# Patient Record
Sex: MALE | Race: WHITE | NOT HISPANIC OR LATINO | Employment: OTHER | URBAN - METROPOLITAN AREA
[De-identification: names, ages, dates, MRNs, and addresses within clinical notes are randomized per-mention and may not be internally consistent; named-entity substitution may affect disease eponyms.]

---

## 2018-07-24 ENCOUNTER — OFFICE VISIT (OUTPATIENT)
Dept: SURGERY | Facility: CLINIC | Age: 83
End: 2018-07-24
Payer: MEDICARE

## 2018-07-24 VITALS
HEART RATE: 60 BPM | WEIGHT: 159.81 LBS | BODY MASS INDEX: 25.08 KG/M2 | SYSTOLIC BLOOD PRESSURE: 112 MMHG | TEMPERATURE: 98 F | DIASTOLIC BLOOD PRESSURE: 57 MMHG | HEIGHT: 67 IN

## 2018-07-24 DIAGNOSIS — K62.4 RECTAL STENOSIS: Primary | ICD-10-CM

## 2018-07-24 PROCEDURE — 99213 OFFICE O/P EST LOW 20 MIN: CPT | Mod: PBBFAC,PO | Performed by: SURGERY

## 2018-07-24 PROCEDURE — 99999 PR PBB SHADOW E&M-EST. PATIENT-LVL III: CPT | Mod: PBBFAC,,, | Performed by: SURGERY

## 2018-07-24 PROCEDURE — 99203 OFFICE O/P NEW LOW 30 MIN: CPT | Mod: S$PBB,,, | Performed by: SURGERY

## 2018-07-24 RX ORDER — TERAZOSIN 10 MG/1
1 CAPSULE ORAL DAILY
COMMUNITY

## 2018-07-24 RX ORDER — LISINOPRIL 10 MG/1
1 TABLET ORAL DAILY
Status: ON HOLD | COMMUNITY
Start: 2018-07-19 | End: 2018-09-24 | Stop reason: HOSPADM

## 2018-07-24 RX ORDER — ATENOLOL 25 MG/1
1 TABLET ORAL DAILY
Status: ON HOLD | COMMUNITY
Start: 2018-06-06 | End: 2018-09-21

## 2018-07-24 NOTE — LETTER
July 24, 2018      Cuauhtemoc Bush MD  [address]  [facility] [place] [address]           [facility] - General Surgery  [address] [place]  [facility] [address]  Phone: [phone]          Patient: Kevin Schulte   MR Number: [id number]   YOB: 1926   Date of Visit: 7/24/2018       Dear Dr. Cuauhtemoc Bush:    Thank you for referring Kevin Schulte to me for evaluation. Attached you will find relevant portions of my assessment and plan of care.    If you have questions, please do not hesitate to call me. I look forward to following Kevin Schulte along with you.    Sincerely,    Nagi Guo MD    Enclosure  CC:  No Recipients    If you would like to receive this communication electronically, please contact [email or web address] or [phone] to request more information on [facility] access.    For providers and/or their staff who would like to refer a patient to [facility], please contact us through our one-stop-shop provider referral line, [facility], at [phone].    If you feel you have received this communication in error or would no longer like to receive these types of communications, please e-mail [email or web address]

## 2018-07-24 NOTE — PATIENT INSTRUCTIONS
Surgery is scheduled for 07/30/18 arrival time per the preop nurse.  Preop will call from [phone]  Fasting after midnight  Someone to drive you home      THE PREOP NURSE WILL CALL, SOMETIMES AS LATE AS 4 PM IN THE AFTERNOON THE DAY BEFORE SURGERY.    Bathe the night before and the morning of your procedure with a Chlorhexidine wash such as Hibiclens, can be purchased at most Pharmacy's.    Special Instruction:Purchase two Fleet Enema;s at your Pharmacy, use one the evening before the procedure and one the morning of the procedure.

## 2018-07-24 NOTE — PROGRESS NOTES
Subjective:       Patient ID: Kevin Schulte is a 89 y.o. male.    Chief Complaint: Consult (Rectal stenosis)    HPI  Pleasant 89 yo M referred to me for evaluation of rectal stenosis.  Pt notes that he had surgery 7 years ago for rectal stenosis.  This was done by Dr Mauricio and required a flap.  He notes that he feels he has another stenosis.  Feels difficulty having a BM and having to strain.  Notes frequent loose stools and pain. He denies bleeding.  Pt notes that he has not had colonoscopy since 2010. He has had significant weight loss in past year due to poor appetite. He lives at home with his wife.   Review of Systems   Constitutional: Negative for activity change, appetite change, diaphoresis, fever and unexpected weight change.   Respiratory: Negative for cough, chest tightness and wheezing.    Cardiovascular: Negative for chest pain and palpitations.   Gastrointestinal: Positive for rectal pain. Negative for abdominal distention, abdominal pain, anal bleeding, blood in stool, constipation, diarrhea, nausea and vomiting.   Genitourinary: Negative for difficulty urinating, dysuria and hematuria.   Musculoskeletal: Negative for back pain and gait problem.   Skin: Negative for color change and wound.   Neurological: Negative for tremors and seizures.   Hematological: Negative for adenopathy. Does not bruise/bleed easily.       Objective:      Physical Exam   Constitutional: He is oriented to person, place, and time.   HENT:   Mouth/Throat: No oropharyngeal exudate.   Neck: No thyromegaly present.   Pulmonary/Chest: Effort normal. No respiratory distress. He exhibits no tenderness.   Abdominal: Soft. He exhibits no distension. There is no tenderness. No hernia.   Genitourinary:   Genitourinary Comments: Ext exam demonstrates significant fecal residue and soiling in the perianal area.  DEANA attempted. Stenosis noted at anal margin   Musculoskeletal: Normal range of motion. He exhibits no tenderness or  deformity.   Lymphadenopathy:     He has no cervical adenopathy.   Neurological: He is alert and oriented to person, place, and time. No cranial nerve deficit.   Skin: Skin is warm. No erythema. No pallor.   Psychiatric: He has a normal mood and affect. His behavior is normal.   Vitals reviewed.      Assessment:     Anal stenosis   No diagnosis found.    Plan:       D/w pt recommended EUA withdilation.  This will be done next Monday.  Informed consent obtained.

## 2018-07-25 RX ORDER — METRONIDAZOLE 500 MG/100ML
500 INJECTION, SOLUTION INTRAVENOUS
Status: CANCELLED | OUTPATIENT
Start: 2018-07-25

## 2018-07-25 RX ORDER — SODIUM CHLORIDE 9 MG/ML
INJECTION, SOLUTION INTRAVENOUS CONTINUOUS
Status: CANCELLED | OUTPATIENT
Start: 2018-07-25

## 2018-07-26 ENCOUNTER — HOSPITAL ENCOUNTER (OUTPATIENT)
Dept: CARDIOLOGY | Facility: HOSPITAL | Age: 83
Discharge: HOME | End: 2018-07-26
Attending: SURGERY
Payer: MEDICARE

## 2018-07-26 PROCEDURE — 93005 ELECTROCARDIOGRAM TRACING: CPT

## 2018-07-29 ENCOUNTER — ANESTHESIA EVENT (OUTPATIENT)
Dept: SURGERY | Facility: HOSPITAL | Age: 83
End: 2018-07-29
Payer: MEDICARE

## 2018-07-30 ENCOUNTER — TELEPHONE (OUTPATIENT)
Dept: SURGERY | Facility: CLINIC | Age: 83
End: 2018-07-30

## 2018-07-30 ENCOUNTER — HOSPITAL ENCOUNTER (OUTPATIENT)
Facility: HOSPITAL | Age: 83
Discharge: HOME | End: 2018-07-30
Attending: SURGERY | Admitting: SURGERY
Payer: MEDICARE

## 2018-07-30 ENCOUNTER — ANESTHESIA (OUTPATIENT)
Dept: SURGERY | Facility: HOSPITAL | Age: 83
End: 2018-07-30
Payer: MEDICARE

## 2018-07-30 DIAGNOSIS — K62.4 RECTAL STENOSIS: ICD-10-CM

## 2018-07-30 PROCEDURE — 71000033 HC RECOVERY, INTIAL HOUR: Performed by: SURGERY

## 2018-07-30 PROCEDURE — 46606 ANOSCOPY AND BIOPSY: CPT | Mod: ,,, | Performed by: SURGERY

## 2018-07-30 PROCEDURE — 37000008 HC ANESTHESIA 1ST 15 MINUTES: Performed by: SURGERY

## 2018-07-30 PROCEDURE — 99900103 DSU ONLY-NO CHARGE-INITIAL HR (STAT): Performed by: SURGERY

## 2018-07-30 PROCEDURE — 99900104 DSU ONLY-NO CHARGE-EA ADD'L HR (STAT): Performed by: SURGERY

## 2018-07-30 PROCEDURE — 36000705 HC OR TIME LEV I EA ADD 15 MIN: Performed by: SURGERY

## 2018-07-30 PROCEDURE — 63600175 PHARM REV CODE 636 W HCPCS: Performed by: SURGERY

## 2018-07-30 PROCEDURE — 25000003 PHARM REV CODE 250: Performed by: ANESTHESIOLOGY

## 2018-07-30 PROCEDURE — D9220A PRA ANESTHESIA: Mod: CRNA,,, | Performed by: NURSE ANESTHETIST, CERTIFIED REGISTERED

## 2018-07-30 PROCEDURE — 71000015 HC POSTOP RECOV 1ST HR: Performed by: SURGERY

## 2018-07-30 PROCEDURE — 25000003 PHARM REV CODE 250: Performed by: SURGERY

## 2018-07-30 PROCEDURE — 71000016 HC POSTOP RECOV ADDL HR: Performed by: SURGERY

## 2018-07-30 PROCEDURE — 37000009 HC ANESTHESIA EA ADD 15 MINS: Performed by: SURGERY

## 2018-07-30 PROCEDURE — 36000704 HC OR TIME LEV I 1ST 15 MIN: Performed by: SURGERY

## 2018-07-30 PROCEDURE — S0030 INJECTION, METRONIDAZOLE: HCPCS | Performed by: SURGERY

## 2018-07-30 PROCEDURE — D9220A PRA ANESTHESIA: Mod: ANES,,, | Performed by: ANESTHESIOLOGY

## 2018-07-30 PROCEDURE — 63600175 PHARM REV CODE 636 W HCPCS: Performed by: NURSE ANESTHETIST, CERTIFIED REGISTERED

## 2018-07-30 PROCEDURE — 88305 TISSUE EXAM BY PATHOLOGIST: CPT | Performed by: PATHOLOGY

## 2018-07-30 RX ORDER — MEPERIDINE HYDROCHLORIDE 25 MG/ML
12.5 INJECTION INTRAMUSCULAR; INTRAVENOUS; SUBCUTANEOUS ONCE AS NEEDED
Status: DISCONTINUED | OUTPATIENT
Start: 2018-07-30 | End: 2018-07-30 | Stop reason: HOSPADM

## 2018-07-30 RX ORDER — SODIUM CHLORIDE, SODIUM LACTATE, POTASSIUM CHLORIDE, CALCIUM CHLORIDE 600; 310; 30; 20 MG/100ML; MG/100ML; MG/100ML; MG/100ML
INJECTION, SOLUTION INTRAVENOUS CONTINUOUS
Status: DISCONTINUED | OUTPATIENT
Start: 2018-07-30 | End: 2018-07-30 | Stop reason: HOSPADM

## 2018-07-30 RX ORDER — ONDANSETRON 2 MG/ML
4 INJECTION INTRAMUSCULAR; INTRAVENOUS ONCE
Status: DISCONTINUED | OUTPATIENT
Start: 2018-07-30 | End: 2018-07-30 | Stop reason: HOSPADM

## 2018-07-30 RX ORDER — LIDOCAINE HYDROCHLORIDE 10 MG/ML
1 INJECTION, SOLUTION EPIDURAL; INFILTRATION; INTRACAUDAL; PERINEURAL ONCE
Status: DISCONTINUED | OUTPATIENT
Start: 2018-07-30 | End: 2018-07-30 | Stop reason: HOSPADM

## 2018-07-30 RX ORDER — DIPHENHYDRAMINE HYDROCHLORIDE 50 MG/ML
25 INJECTION INTRAMUSCULAR; INTRAVENOUS EVERY 6 HOURS PRN
Status: DISCONTINUED | OUTPATIENT
Start: 2018-07-30 | End: 2018-07-30 | Stop reason: HOSPADM

## 2018-07-30 RX ORDER — SODIUM CHLORIDE 0.9 % (FLUSH) 0.9 %
3 SYRINGE (ML) INJECTION
Status: DISCONTINUED | OUTPATIENT
Start: 2018-07-30 | End: 2018-07-30 | Stop reason: HOSPADM

## 2018-07-30 RX ORDER — METOCLOPRAMIDE HYDROCHLORIDE 5 MG/ML
5 INJECTION INTRAMUSCULAR; INTRAVENOUS EVERY 6 HOURS PRN
Status: DISCONTINUED | OUTPATIENT
Start: 2018-07-30 | End: 2018-07-30 | Stop reason: HOSPADM

## 2018-07-30 RX ORDER — LIDOCAINE HCL/PF 100 MG/5ML
SYRINGE (ML) INTRAVENOUS
Status: DISCONTINUED | OUTPATIENT
Start: 2018-07-30 | End: 2018-07-30

## 2018-07-30 RX ORDER — SODIUM CHLORIDE, SODIUM LACTATE, POTASSIUM CHLORIDE, CALCIUM CHLORIDE 600; 310; 30; 20 MG/100ML; MG/100ML; MG/100ML; MG/100ML
75 INJECTION, SOLUTION INTRAVENOUS CONTINUOUS
Status: DISCONTINUED | OUTPATIENT
Start: 2018-07-30 | End: 2018-07-30 | Stop reason: HOSPADM

## 2018-07-30 RX ORDER — PROPOFOL 10 MG/ML
VIAL (ML) INTRAVENOUS
Status: DISCONTINUED | OUTPATIENT
Start: 2018-07-30 | End: 2018-07-30

## 2018-07-30 RX ORDER — SODIUM CHLORIDE 9 MG/ML
INJECTION, SOLUTION INTRAVENOUS CONTINUOUS
Status: DISCONTINUED | OUTPATIENT
Start: 2018-07-30 | End: 2018-07-30 | Stop reason: HOSPADM

## 2018-07-30 RX ORDER — FENTANYL CITRATE 50 UG/ML
25 INJECTION, SOLUTION INTRAMUSCULAR; INTRAVENOUS EVERY 5 MIN PRN
Status: DISCONTINUED | OUTPATIENT
Start: 2018-07-30 | End: 2018-07-30 | Stop reason: HOSPADM

## 2018-07-30 RX ORDER — FENTANYL CITRATE 50 UG/ML
INJECTION, SOLUTION INTRAMUSCULAR; INTRAVENOUS
Status: DISCONTINUED | OUTPATIENT
Start: 2018-07-30 | End: 2018-07-30

## 2018-07-30 RX ORDER — HYDROMORPHONE HYDROCHLORIDE 1 MG/ML
0.2 INJECTION, SOLUTION INTRAMUSCULAR; INTRAVENOUS; SUBCUTANEOUS EVERY 5 MIN PRN
Status: DISCONTINUED | OUTPATIENT
Start: 2018-07-30 | End: 2018-07-30 | Stop reason: HOSPADM

## 2018-07-30 RX ORDER — OXYCODONE AND ACETAMINOPHEN 5; 325 MG/1; MG/1
1 TABLET ORAL EVERY 4 HOURS PRN
Qty: 30 TABLET | Refills: 0 | Status: ON HOLD | OUTPATIENT
Start: 2018-07-30 | End: 2018-09-24 | Stop reason: HOSPADM

## 2018-07-30 RX ORDER — METRONIDAZOLE 500 MG/100ML
500 INJECTION, SOLUTION INTRAVENOUS
Status: COMPLETED | OUTPATIENT
Start: 2018-07-30 | End: 2018-07-30

## 2018-07-30 RX ORDER — BUPIVACAINE HYDROCHLORIDE AND EPINEPHRINE 2.5; 5 MG/ML; UG/ML
INJECTION, SOLUTION EPIDURAL; INFILTRATION; INTRACAUDAL; PERINEURAL
Status: DISCONTINUED | OUTPATIENT
Start: 2018-07-30 | End: 2018-07-30 | Stop reason: HOSPADM

## 2018-07-30 RX ORDER — HYDROCODONE BITARTRATE AND ACETAMINOPHEN 5; 325 MG/1; MG/1
1 TABLET ORAL EVERY 4 HOURS PRN
Status: DISCONTINUED | OUTPATIENT
Start: 2018-07-30 | End: 2018-07-30 | Stop reason: HOSPADM

## 2018-07-30 RX ORDER — OXYCODONE HYDROCHLORIDE 5 MG/1
5 TABLET ORAL
Status: DISCONTINUED | OUTPATIENT
Start: 2018-07-30 | End: 2018-07-30 | Stop reason: HOSPADM

## 2018-07-30 RX ADMIN — FENTANYL CITRATE 50 MCG: 50 INJECTION, SOLUTION INTRAMUSCULAR; INTRAVENOUS at 09:07

## 2018-07-30 RX ADMIN — PROPOFOL 80 MG: 10 INJECTION, EMULSION INTRAVENOUS at 09:07

## 2018-07-30 RX ADMIN — SODIUM CHLORIDE, SODIUM LACTATE, POTASSIUM CHLORIDE, AND CALCIUM CHLORIDE: .6; .31; .03; .02 INJECTION, SOLUTION INTRAVENOUS at 09:07

## 2018-07-30 RX ADMIN — LIDOCAINE HYDROCHLORIDE 75 MG: 20 INJECTION, SOLUTION INTRAVENOUS at 09:07

## 2018-07-30 RX ADMIN — CEFTRIAXONE 2 G: 2 INJECTION, POWDER, FOR SOLUTION INTRAMUSCULAR; INTRAVENOUS at 09:07

## 2018-07-30 RX ADMIN — METRONIDAZOLE 500 MG: 500 INJECTION, SOLUTION INTRAVENOUS at 09:07

## 2018-07-30 NOTE — TELEPHONE ENCOUNTER
----- Message from Jazzy Lucio sent at 7/30/2018 11:39 AM CDT -----  Contact: Monica -[facility]  Monica -[facility] calling needs to schedule pt a post-op visit for 2 weeks please...[phone] (home)

## 2018-07-30 NOTE — ANESTHESIA POSTPROCEDURE EVALUATION
"Anesthesia Post Evaluation    Patient: Kevin Schulte    Procedure(s) Performed: Procedure(s) (LRB):  Exam under anesthesia (N/A)  DILATION, RECTUM (N/A)  BIOPSY, ANORECTAL WALL (N/A)    Final Anesthesia Type: general  Patient location during evaluation: PACU  Patient participation: Yes- Able to Participate  Level of consciousness: awake and alert and oriented  Post-procedure vital signs: reviewed and stable  Pain management: adequate  Airway patency: patent  PONV status at discharge: No PONV  Anesthetic complications: no      Cardiovascular status: blood pressure returned to baseline  Respiratory status: unassisted, spontaneous ventilation and room air  Hydration status: euvolemic  Follow-up not needed.        Visit Vitals  BP (!) 122/56   Pulse 72   Temp 36.7 °C (98.1 °F) (Temporal)   Resp (!) 21   Ht 5' 7" (1.702 m)   Wt 72.6 kg (160 lb)   SpO2 100%   BMI 25.06 kg/m²       Pain/Juaquin Score: Pain Assessment Performed: Yes (7/30/2018 10:10 AM)  Presence of Pain: denies (7/30/2018 10:30 AM)  Juaquin Score: 9 (7/30/2018 10:30 AM)      "

## 2018-07-30 NOTE — INTERVAL H&P NOTE
The patient has been examined and the H&P has been reviewed:    I concur with the findings and no changes have occurred since H&P was written.    Anesthesia/Surgery risks, benefits and alternative options discussed and understood by patient/family.          Active Hospital Problems    Diagnosis  POA    Rectal stenosis [K62.4]  Yes      Resolved Hospital Problems    Diagnosis Date Resolved POA   No resolved problems to display.

## 2018-07-30 NOTE — BRIEF OP NOTE
[facility]  Brief Operative Note     SUMMARY     Surgery Date: 7/30/2018     Surgeon(s) and Role:     * Nagi Guo MD - Primary    Assisting Surgeon: None    Pre-op Diagnosis:  Rectal stenosis [K62.4]    Post-op Diagnosis:  Post-Op Diagnosis Codes:     * Rectal stenosis [K62.4]    Procedure(s) (LRB):  Exam under anesthesia (N/A)  DILATION, RECTUM (N/A)  BIOPSY, ANORECTAL WALL (N/A)    Anesthesia: General    Description of the findings of the procedure: Stenosis of anal canal.  Appearance of scarring from previous surgery.  No mass or signs of malignancy .  Biopsy taken    Findings/Key Components: see above.     Estimated Blood Loss: 5 cc's       Specimens:   Specimen (12h ago through future)    Start     Ordered    07/30/18 0955  Specimen to Pathology - Surgery  Once     Comments:  Pre-op Diagnosis: Rectal stenosis [K62.4]Post-op Diagnosis: sameProcedure(s):Exam under anesthesia Number of specimens: 1Name of specimens: Rectal Biopsy      07/30/18 0957          Discharge Note    SUMMARY     Admit Date: 7/30/2018    Discharge Date and Time:  07/30/2018 10:09 AM    Hospital Course (synopsis of major diagnoses, care, treatment, and services provided during the course of the hospital stay): Pt presented for EUA with dilation and biopsy of anal stenosis. Procedure and post op course were uneventful.       Final Diagnosis: Post-Op Diagnosis Codes:     * Rectal stenosis [K62.4]    Disposition: Home or Self Care    Follow Up/Patient Instructions:     Medications:  Reconciled Home Medications:      Medication List      START taking these medications    oxyCODONE-acetaminophen 5-325 mg per tablet  Commonly known as:  PERCOCET  Take 1 tablet by mouth every 4 (four) hours as needed for Pain.        CONTINUE taking these medications    atenolol 25 MG tablet  Commonly known as:  TENORMIN  Take 1 tablet by mouth once daily.     lisinopril 10 MG tablet  Take 1 tablet by mouth once daily.     terazosin 10 MG  capsule  Commonly known as:  HYTRIN  Take 1 capsule by mouth once daily.            Discharge Procedure Orders  Diet general     Call MD for:  extreme fatigue     Call MD for:  persistent dizziness or light-headedness     Call MD for:  hives     Call MD for:  redness, tenderness, or signs of infection (pain, swelling, redness, odor or green/yellow discharge around incision site)     Call MD for:  difficulty breathing, headache or visual disturbances     Call MD for:  severe uncontrolled pain     Call MD for:  persistent nausea and vomiting     Call MD for:  temperature >100.4     Remove dressing in 48 hours     Activity as tolerated       Follow-up Information     Nagi Guo MD In 2 weeks.    Specialties:  General Surgery, Colon and Rectal Surgery, Surgery  Contact information:  [address] [place] [address]  [phone]

## 2018-07-30 NOTE — PLAN OF CARE
Pt in phase II rec. Wife at bedside explained dc isntructions to pt and wife. Both verb understanding. Pt voided 100 cc clear yel urine  Will wheel to car via wheelchair when able

## 2018-07-30 NOTE — H&P (VIEW-ONLY)
Subjective:       Patient ID: Kevin Schulte is a 89 y.o. male.    Chief Complaint: Consult (Rectal stenosis)    HPI  Pleasant 89 yo M referred to me for evaluation of rectal stenosis.  Pt notes that he had surgery 7 years ago for rectal stenosis.  This was done by Dr Mauricio and required a flap.  He notes that he feels he has another stenosis.  Feels difficulty having a BM and having to strain.  Notes frequent loose stools and pain. He denies bleeding.  Pt notes that he has not had colonoscopy since 2010. He has had significant weight loss in past year due to poor appetite. He lives at home with his wife.   Review of Systems   Constitutional: Negative for activity change, appetite change, diaphoresis, fever and unexpected weight change.   Respiratory: Negative for cough, chest tightness and wheezing.    Cardiovascular: Negative for chest pain and palpitations.   Gastrointestinal: Positive for rectal pain. Negative for abdominal distention, abdominal pain, anal bleeding, blood in stool, constipation, diarrhea, nausea and vomiting.   Genitourinary: Negative for difficulty urinating, dysuria and hematuria.   Musculoskeletal: Negative for back pain and gait problem.   Skin: Negative for color change and wound.   Neurological: Negative for tremors and seizures.   Hematological: Negative for adenopathy. Does not bruise/bleed easily.       Objective:      Physical Exam   Constitutional: He is oriented to person, place, and time.   HENT:   Mouth/Throat: No oropharyngeal exudate.   Neck: No thyromegaly present.   Pulmonary/Chest: Effort normal. No respiratory distress. He exhibits no tenderness.   Abdominal: Soft. He exhibits no distension. There is no tenderness. No hernia.   Genitourinary:   Genitourinary Comments: Ext exam demonstrates significant fecal residue and soiling in the perianal area.  DEANA attempted. Stenosis noted at anal margin   Musculoskeletal: Normal range of motion. He exhibits no tenderness or  deformity.   Lymphadenopathy:     He has no cervical adenopathy.   Neurological: He is alert and oriented to person, place, and time. No cranial nerve deficit.   Skin: Skin is warm. No erythema. No pallor.   Psychiatric: He has a normal mood and affect. His behavior is normal.   Vitals reviewed.      Assessment:     Anal stenosis   No diagnosis found.    Plan:       D/w pt recommended EUA withdilation.  This will be done next Monday.  Informed consent obtained.

## 2018-07-30 NOTE — TRANSFER OF CARE
"Anesthesia Transfer of Care Note    Patient: Kevin Schulte    Procedure(s) Performed: Procedure(s) (LRB):  Exam under anesthesia (N/A)  DILATION, RECTUM (N/A)  BIOPSY, ANORECTAL WALL (N/A)    Patient location: PACU    Anesthesia Type: general    Transport from OR: Transported from OR on 2-3 L/min O2 by NC with adequate spontaneous ventilation    Post pain: adequate analgesia    Post assessment: no apparent anesthetic complications    Post vital signs: stable    Level of consciousness: awake    Nausea/Vomiting: no nausea/vomiting    Complications: none    Transfer of care protocol was followed      Last vitals:   Visit Vitals  BP (!) 159/70 (BP Location: Left arm, Patient Position: Lying)   Pulse 72   Temp 36.6 °C (97.9 °F) (Oral)   Resp 16   Ht 5' 7" (1.702 m)   Wt 72.6 kg (160 lb)   SpO2 100%   BMI 25.06 kg/m²     "

## 2018-07-30 NOTE — PLAN OF CARE
Pt. AAOx4, calm and cooperative.  Pt. Requires assistance to transfer due to limited mobility and arthritis.  Pt. Uses wheelchair to ambulate.  Pt. Unable to shower due to inabilty to safely get in and out of shower or bath at home. RN cleansed rectal area with several chlorhexidine wipes.  Rn reviewed plan of care with pt. And spouse who both verbalized understanding.  Questions answered, comfort assured.

## 2018-07-30 NOTE — OP NOTE
DATE OF PROCEDURE:  07/30/2018.    STAFF SURGEON:  Nagi Guo M.D.    PREOPERATIVE DIAGNOSIS:  Anal stenosis.    POSTOPERATIVE DIAGNOSIS:  Anal stenosis.    PROCEDURES PERFORMED:  1.  Exam under anesthesia with dilatation of anal stenosis.  2.  Biopsy of anal stenosis.    ANESTHESIA:  General via LMA.    INDICATION FOR PROCEDURE:  A pleasant 89-year-old gentleman who presented to my   office with concern for worsening anal stenosis.  The patient has had previous   surgery approximately 12 years ago for anal stenosis with anoplasty by Dr. Mauricio, adhesions were lysed.  He has had difficulty with bowel movements.  On   exam, he was noted to have anal stenosis.    DESCRIPTION OF PROCEDURE:  Following signing of informed consent, he received   preoperative IV antibiotics, taken to the OR and placed in supine position.    SCDs were placed.  General anesthesia via an LMA was administered.  He was   rolled and placed in left lateral position.  With buttocks apart, the perianal   area was prepped and draped in standard fashion.  A timeout was then performed.    I then performed digital rectal exam.  I was able to insert my middle finger   with some ____ there was stenosis.  I then progressively dilated with Hegar   dilators beginning with a size 12 and advanced all the way up to a size 18.    Having done that, I placed a dilator ____ in there for approximately 30 seconds   to a minute ____ done this, there was much more mobility and I was able to   visualize better.  The anoscope was inserted.  No obvious mass present.  There   seem to be scarring.  This stenosis seem to be related to scarring from previous   surgery.  Biopsies were taken.  I cleaned the area out, dressing was applied.    The patient was awakened and taken to Recovery Room in stable condition.  There   were no immediate complications.      RAHEEM/IN  dd: 07/30/2018 10:13:16 (CDT)  td: 07/30/2018 14:22:29 (CDT)  Doc ID   #[id number]  Job ID #[id number]    CC:

## 2018-07-30 NOTE — PLAN OF CARE
Tolerated procedure well transferred to recovery via stretcher.  Report given to SOPHIE Cunningham.

## 2018-07-30 NOTE — ANESTHESIA PREPROCEDURE EVALUATION
2018  Kevin Schulte is a 89 y.o., male.    Anesthesia Evaluation    I have reviewed the Patient Summary Reports.    I have reviewed the Nursing Notes.   I have reviewed the Medications.     Review of Systems  Anesthesia Hx:  No problems with previous Anesthesia    Social:  Former Smoker Smoking Status: Former Smoker  Quit Smokin88  Smokeless Tobacco Status: Never Used  Alcohol use: Not Asked  Drug use: Not Asked       Hepatic/GI:  Denies Hepatic/GI Symptoms  Denies Bowel Conditions        Physical Exam  General:  Obesity    Airway/Jaw/Neck:  Airway Findings: Mouth Opening: Normal Tongue: Normal  General Airway Assessment: Adult, Good  Mallampati: II  Improves to II with phonation.  TM Distance: 4-6 cm      Dental:  Dental Findings: In tact   Chest/Lungs:  Chest/Lungs Findings: Clear to auscultation, Normal Respiratory Rate     Heart/Vascular:  Heart Findings: Rate: Normal  Rhythm: Regular Rhythm  Sounds: Normal  Heart murmur: negative       Mental Status:  Mental Status Findings:  Cooperative, Alert and Oriented         Anesthesia Plan  Type of Anesthesia, risks & benefits discussed:  Anesthesia Type:  general  Patient's Preference:   Intra-op Monitoring Plan: standard ASA monitors  Intra-op Monitoring Plan Comments:   Post Op Pain Control Plan:   Post Op Pain Control Plan Comments:   Induction:   IV  Beta Blocker:  Patient is on a Beta-Blocker and has received one dose within the past 24 hours (No further documentation required).       Informed Consent: Patient understands risks and agrees with Anesthesia plan.  Questions answered. Anesthesia consent signed with patient.  ASA Score: 3     Day of Surgery Review of History & Physical: I have interviewed and examined the patient. I have reviewed the patient's H&P dated:  There are no significant changes.          Ready For Surgery From  Anesthesia Perspective.

## 2018-07-31 VITALS
WEIGHT: 160 LBS | OXYGEN SATURATION: 100 % | TEMPERATURE: 98 F | HEART RATE: 76 BPM | HEIGHT: 67 IN | BODY MASS INDEX: 25.11 KG/M2 | DIASTOLIC BLOOD PRESSURE: 60 MMHG | SYSTOLIC BLOOD PRESSURE: 117 MMHG | RESPIRATION RATE: 16 BRPM

## 2018-08-07 ENCOUNTER — TELEPHONE (OUTPATIENT)
Dept: SURGERY | Facility: CLINIC | Age: 83
End: 2018-08-07

## 2018-08-07 NOTE — TELEPHONE ENCOUNTER
----- Message from Mecca Mullen sent at 8/7/2018 12:43 PM CDT -----  Contact: [facility]  Type: Needs Medical Advice    Who Called:  wife  Symptoms (please be specific):  See bleow  How long has patient had these symptoms:  See below  Pharmacy name and phone #:  [facility] in [place], [phone]  Best Call Back Number: [phone]  Additional Information:  Patient's wife is calling to state patient has had one bowel movement since procedure 7/30/18 and feels as if has something to expel but can not do so. Asking for suggestions to help expel. Thanks!

## 2018-08-07 NOTE — TELEPHONE ENCOUNTER
Patient is constipated, has tried Milk of Magnesia and Miralax with no relief.  Recommend to try Citrate of Magnesia, drink lots of fluids and move around.

## 2018-08-14 ENCOUNTER — OFFICE VISIT (OUTPATIENT)
Dept: SURGERY | Facility: CLINIC | Age: 83
End: 2018-08-14
Payer: MEDICARE

## 2018-08-14 VITALS
HEART RATE: 94 BPM | DIASTOLIC BLOOD PRESSURE: 65 MMHG | HEIGHT: 67 IN | SYSTOLIC BLOOD PRESSURE: 145 MMHG | TEMPERATURE: 98 F | BODY MASS INDEX: 25.99 KG/M2 | WEIGHT: 165.56 LBS

## 2018-08-14 DIAGNOSIS — Z09 POSTOP CHECK: Primary | ICD-10-CM

## 2018-08-14 PROBLEM — K62.4 RECTAL STENOSIS: Status: RESOLVED | Noted: 2018-07-30 | Resolved: 2018-08-14

## 2018-08-14 PROCEDURE — 99213 OFFICE O/P EST LOW 20 MIN: CPT | Mod: PBBFAC,PO | Performed by: SURGERY

## 2018-08-14 PROCEDURE — 99999 PR PBB SHADOW E&M-EST. PATIENT-LVL III: CPT | Mod: PBBFAC,,, | Performed by: SURGERY

## 2018-08-14 PROCEDURE — 99024 POSTOP FOLLOW-UP VISIT: CPT | Mod: POP,,, | Performed by: SURGERY

## 2018-08-14 NOTE — PROGRESS NOTES
Cc: post op    HPI: 89 y.o.  male  2 weeks s/p EUA with dilation.   Pt notes that he is feeling well.  No pain. Having better bowel function over the last 4 days.      PE: AFVSS    AAOx3  CTA  Soft/NT/nd  Inc: c/d/i        Path:   ANAL MUCOSAL BIOPSIES, CLINICALLY IDENTIFIED AS RECTAL BIOPSIES:  -Polypoid fragment of benign squamous-lined tissue with marked fibrosis, mild hyperkeratosis and focal  parakeratosis.  -No evidence of dysplasia or malignancy.  NMCH.  Diagnosed    A/P:   Pt doing well post surgery.   F/U with me prn  Pt may need repeat dilation in future.

## 2018-09-18 ENCOUNTER — HOSPITAL ENCOUNTER (INPATIENT)
Facility: HOSPITAL | Age: 83
LOS: 4 days | Discharge: NURSING, REHAB OR LONG-TERM CARE | DRG: 291 | End: 2018-09-24
Attending: FAMILY MEDICINE | Admitting: INTERNAL MEDICINE
Payer: MEDICARE

## 2018-09-18 DIAGNOSIS — I50.9 ACUTE ON CHRONIC CONGESTIVE HEART FAILURE, UNSPECIFIED HEART FAILURE TYPE: Primary | ICD-10-CM

## 2018-09-18 DIAGNOSIS — I50.9 CHF (CONGESTIVE HEART FAILURE): ICD-10-CM

## 2018-09-18 LAB
ALBUMIN SERPL BCP-MCNC: 2.9 G/DL
ALP SERPL-CCNC: 51 U/L
ALT SERPL W/O P-5'-P-CCNC: 22 U/L
ANION GAP SERPL CALC-SCNC: 12 MMOL/L
AST SERPL-CCNC: 50 U/L
BASOPHILS # BLD AUTO: 0.01 K/UL
BASOPHILS NFR BLD: 0.1 %
BILIRUB SERPL-MCNC: 0.7 MG/DL
BNP SERPL-MCNC: 2054 PG/ML
BUN SERPL-MCNC: 26 MG/DL
CALCIUM SERPL-MCNC: 8.4 MG/DL
CHLORIDE SERPL-SCNC: 96 MMOL/L
CO2 SERPL-SCNC: 21 MMOL/L
CREAT SERPL-MCNC: 1.4 MG/DL
DIFFERENTIAL METHOD: ABNORMAL
EOSINOPHIL # BLD AUTO: 0 K/UL
EOSINOPHIL NFR BLD: 0 %
ERYTHROCYTE [DISTWIDTH] IN BLOOD BY AUTOMATED COUNT: 13.9 %
EST. GFR  (AFRICAN AMERICAN): 50.1 ML/MIN/1.73 M^2
EST. GFR  (NON AFRICAN AMERICAN): 43.3 ML/MIN/1.73 M^2
GLUCOSE SERPL-MCNC: 228 MG/DL
HCT VFR BLD AUTO: 27.4 %
HGB BLD-MCNC: 9.1 G/DL
IMM GRANULOCYTES # BLD AUTO: 0.08 K/UL
IMM GRANULOCYTES NFR BLD AUTO: 0.6 %
LYMPHOCYTES # BLD AUTO: 0.4 K/UL
LYMPHOCYTES NFR BLD: 3.2 %
MCH RBC QN AUTO: 30.4 PG
MCHC RBC AUTO-ENTMCNC: 33.2 G/DL
MCV RBC AUTO: 92 FL
MONOCYTES # BLD AUTO: 1.3 K/UL
MONOCYTES NFR BLD: 10.2 %
NEUTROPHILS # BLD AUTO: 10.6 K/UL
NEUTROPHILS NFR BLD: 85.9 %
NRBC BLD-RTO: 0 /100 WBC
PLATELET # BLD AUTO: 168 K/UL
PMV BLD AUTO: 11.3 FL
POTASSIUM SERPL-SCNC: 4 MMOL/L
PROT SERPL-MCNC: 6.5 G/DL
RBC # BLD AUTO: 2.99 M/UL
SODIUM SERPL-SCNC: 129 MMOL/L
WBC # BLD AUTO: 12.36 K/UL

## 2018-09-18 PROCEDURE — G0378 HOSPITAL OBSERVATION PER HR: HCPCS

## 2018-09-18 PROCEDURE — 99285 EMERGENCY DEPT VISIT HI MDM: CPT | Mod: 25

## 2018-09-18 PROCEDURE — 63600175 PHARM REV CODE 636 W HCPCS: Performed by: FAMILY MEDICINE

## 2018-09-18 PROCEDURE — 96372 THER/PROPH/DIAG INJ SC/IM: CPT | Mod: 59

## 2018-09-18 PROCEDURE — 25000003 PHARM REV CODE 250: Performed by: INTERNAL MEDICINE

## 2018-09-18 PROCEDURE — 83880 ASSAY OF NATRIURETIC PEPTIDE: CPT

## 2018-09-18 PROCEDURE — 27000221 HC OXYGEN, UP TO 24 HOURS

## 2018-09-18 PROCEDURE — 63600175 PHARM REV CODE 636 W HCPCS: Performed by: INTERNAL MEDICINE

## 2018-09-18 PROCEDURE — 85025 COMPLETE CBC W/AUTO DIFF WBC: CPT

## 2018-09-18 PROCEDURE — 70450 CT HEAD/BRAIN W/O DYE: CPT | Mod: TC

## 2018-09-18 PROCEDURE — 72125 CT NECK SPINE W/O DYE: CPT | Mod: 26,,, | Performed by: RADIOLOGY

## 2018-09-18 PROCEDURE — 72125 CT NECK SPINE W/O DYE: CPT | Mod: TC

## 2018-09-18 PROCEDURE — 96374 THER/PROPH/DIAG INJ IV PUSH: CPT

## 2018-09-18 PROCEDURE — 94760 N-INVAS EAR/PLS OXIMETRY 1: CPT

## 2018-09-18 PROCEDURE — 96376 TX/PRO/DX INJ SAME DRUG ADON: CPT

## 2018-09-18 PROCEDURE — 70450 CT HEAD/BRAIN W/O DYE: CPT | Mod: 26,,, | Performed by: RADIOLOGY

## 2018-09-18 PROCEDURE — 25000003 PHARM REV CODE 250: Performed by: FAMILY MEDICINE

## 2018-09-18 PROCEDURE — 80053 COMPREHEN METABOLIC PANEL: CPT

## 2018-09-18 PROCEDURE — 71045 X-RAY EXAM CHEST 1 VIEW: CPT | Mod: 26,,, | Performed by: RADIOLOGY

## 2018-09-18 PROCEDURE — 71045 X-RAY EXAM CHEST 1 VIEW: CPT | Mod: TC,FY

## 2018-09-18 RX ORDER — TERAZOSIN 1 MG/1
10 CAPSULE ORAL DAILY
Status: DISCONTINUED | OUTPATIENT
Start: 2018-09-18 | End: 2018-09-20

## 2018-09-18 RX ORDER — FUROSEMIDE 10 MG/ML
40 INJECTION INTRAMUSCULAR; INTRAVENOUS 2 TIMES DAILY
Status: DISCONTINUED | OUTPATIENT
Start: 2018-09-18 | End: 2018-09-22

## 2018-09-18 RX ORDER — FUROSEMIDE 10 MG/ML
60 INJECTION INTRAMUSCULAR; INTRAVENOUS
Status: COMPLETED | OUTPATIENT
Start: 2018-09-18 | End: 2018-09-18

## 2018-09-18 RX ORDER — ATENOLOL 25 MG/1
25 TABLET ORAL DAILY
Status: DISCONTINUED | OUTPATIENT
Start: 2018-09-19 | End: 2018-09-21

## 2018-09-18 RX ORDER — POTASSIUM CHLORIDE 20 MEQ/1
20 TABLET, EXTENDED RELEASE ORAL 2 TIMES DAILY
Status: DISCONTINUED | OUTPATIENT
Start: 2018-09-18 | End: 2018-09-22

## 2018-09-18 RX ORDER — TRAMADOL HYDROCHLORIDE 50 MG/1
50 TABLET ORAL EVERY 6 HOURS PRN
COMMUNITY

## 2018-09-18 RX ORDER — LISINOPRIL 2.5 MG/1
5 TABLET ORAL DAILY
Status: DISCONTINUED | OUTPATIENT
Start: 2018-09-19 | End: 2018-09-19

## 2018-09-18 RX ORDER — ENOXAPARIN SODIUM 100 MG/ML
40 INJECTION SUBCUTANEOUS EVERY 24 HOURS
Status: DISCONTINUED | OUTPATIENT
Start: 2018-09-18 | End: 2018-09-24 | Stop reason: HOSPADM

## 2018-09-18 RX ADMIN — POTASSIUM CHLORIDE 20 MEQ: 1500 TABLET, EXTENDED RELEASE ORAL at 08:09

## 2018-09-18 RX ADMIN — FUROSEMIDE 60 MG: 10 INJECTION, SOLUTION INTRAMUSCULAR; INTRAVENOUS at 02:09

## 2018-09-18 RX ADMIN — TERAZOSIN HYDROCHLORIDE ANHYDROUS 10 MG: 1 CAPSULE ORAL at 04:09

## 2018-09-18 RX ADMIN — FUROSEMIDE 40 MG: 10 INJECTION, SOLUTION INTRAMUSCULAR; INTRAVENOUS at 05:09

## 2018-09-18 RX ADMIN — ENOXAPARIN SODIUM 40 MG: 100 INJECTION SUBCUTANEOUS at 05:09

## 2018-09-18 NOTE — PLAN OF CARE
09/18/18 1640   COATES Message   Medicare Outpatient and Observation Notification regarding financial responsibility Given to patient/caregiver;Explained to patient/caregiver;Signed/date by patient/caregiver   Date COATES was signed 09/18/18   Time COATES was signed 1640

## 2018-09-18 NOTE — H&P
[facility] Medicine  History & Physical    Patient Name: Kevin Schulte  MRN: [id number]  Admission Date: 9/18/2018  Attending Physician: Cuauhtemoc Bush MD   Primary Care Provider: Cuauhtemoc Bush MD         Patient information was obtained from patient, spouse/SO and ER records.     Subjective:     Principal Problem:Acute on chronic congestive heart failure    Chief Complaint:   Chief Complaint   Patient presents with    Fall     right leg gave away and fell. Head injury        HPI: Patient is 89-year-old male.  Dementia is present.  The patient fell approximately a week ago and presents emergency room at this time for workup for pain back right leg.  Noted on chest x-ray to have congestive heart failure.  The patient also complained of shortness of breath after questions.  Patient does not have a previous history of congestive heart failure.  He has been resistant workup in the past.  Patient has no peripheral edema.    Past Medical History:   Diagnosis Date    Hypertension        Past Surgical History:   Procedure Laterality Date    BIOPSY OF ANORECTAL WALL N/A 7/30/2018    Procedure: BIOPSY, ANORECTAL WALL;  Surgeon: Nagi Guo MD;  Location: [facility] OR;  Service: Colon and Rectal;  Laterality: N/A;    BIOPSY, ANORECTAL WALL N/A 7/30/2018    Performed by Nagi Guo MD at [facility] OR    DILATION, RECTUM N/A 7/30/2018    Performed by Nagi Guo MD at [facility] OR    Exam under anesthesia N/A 7/30/2018    Performed by Nagi Guo MD at [facility] OR    EXAMINATION UNDER ANESTHESIA N/A 7/30/2018    Procedure: Exam under anesthesia;  Surgeon: Nagi Guo MD;  Location: [facility] OR;  Service: Colon and Rectal;  Laterality: N/A;    hemorrhoidectopm      RECTAL DILATATION N/A 7/30/2018    Procedure: DILATION, RECTUM;  Surgeon: Nagi Guo MD;  Location: [facility] OR;  Service: Colon and Rectal;  Laterality: N/A;       Review of patient's allergies  indicates:  No Known Allergies    No current facility-administered medications on file prior to encounter.      Current Outpatient Medications on File Prior to Encounter   Medication Sig    terazosin (HYTRIN) 10 MG capsule Take 1 capsule by mouth once daily.    traMADol (ULTRAM) 50 mg tablet Take 50 mg by mouth every 6 (six) hours as needed for Pain.    atenolol (TENORMIN) 25 MG tablet Take 1 tablet by mouth once daily.    lisinopril 10 MG tablet Take 1 tablet by mouth once daily.    oxyCODONE-acetaminophen (PERCOCET) 5-325 mg per tablet Take 1 tablet by mouth every 4 (four) hours as needed for Pain.     Family History     None        Tobacco Use    Smoking status: Former Smoker     Packs/day: 0.25     Types: Cigarettes     Last attempt to quit: 1988     Years since quittin.1    Smokeless tobacco: Never Used   Substance and Sexual Activity    Alcohol use: Not on file    Drug use: Not on file    Sexual activity: Not on file     Review of Systems   Constitutional: Positive for activity change, appetite change, fatigue and unexpected weight change. Negative for chills, diaphoresis and fever.   HENT: Negative for congestion, drooling, hearing loss and trouble swallowing.    Eyes: Negative for pain and visual disturbance.   Respiratory: Positive for shortness of breath. Negative for apnea, cough, choking, chest tightness and wheezing.    Cardiovascular: Negative for chest pain, palpitations and leg swelling.   Gastrointestinal: Negative for abdominal distention, abdominal pain, blood in stool, constipation, diarrhea, nausea and vomiting.   Endocrine: Negative for polydipsia, polyphagia and polyuria.   Genitourinary: Negative for difficulty urinating, dysuria and flank pain.   Musculoskeletal: Positive for arthralgias, back pain and neck pain. Negative for gait problem, joint swelling and neck stiffness.   Skin: Negative for color change, rash and wound.   Allergic/Immunologic: Negative for  environmental allergies, food allergies and immunocompromised state.   Neurological: Positive for dizziness, syncope and weakness. Negative for numbness and headaches.   Hematological: Negative for adenopathy.   Psychiatric/Behavioral: Negative for agitation, confusion and sleep disturbance. The patient is not nervous/anxious.      Objective:     Vital Signs (Most Recent):  Temp: 97 °F (36.1 °C) (09/18/18 1510)  Pulse: 107 (09/18/18 1510)  Resp: 16 (09/18/18 1510)  BP: 128/71 (09/18/18 1510)  SpO2: (!) 94 % (09/18/18 1510) Vital Signs (24h Range):  Temp:  [97 °F (36.1 °C)-98.7 °F (37.1 °C)] 97 °F (36.1 °C)  Pulse:  [103-116] 107  Resp:  [16-24] 16  SpO2:  [90 %-96 %] 94 %  BP: (103-128)/(62-78) 128/71     Weight: 76.7 kg (169 lb)  Body mass index is 27.28 kg/m².    Physical Exam   Constitutional: He is oriented to person, place, and time. He appears well-developed and well-nourished.   HENT:   Head: Normocephalic and atraumatic.   Right Ear: External ear normal.   Left Ear: External ear normal.   Nose: Nose normal.   Eyes: Conjunctivae, EOM and lids are normal. Pupils are equal, round, and reactive to light.   Neck: Trachea normal, normal range of motion and full passive range of motion without pain. Neck supple.   Cardiovascular: Normal rate, regular rhythm, S1 normal, S2 normal, normal heart sounds, intact distal pulses and normal pulses.   Pulmonary/Chest: Effort normal. He has rales.   Rales decreased breath sounds lower bases.   Abdominal: Soft. Normal aorta and bowel sounds are normal.   Musculoskeletal: Normal range of motion.   Neurological: He is alert and oriented to person, place, and time. He has normal reflexes.   Skin: Skin is warm, dry and intact. Capillary refill takes less than 2 seconds.   Psychiatric: He has a normal mood and affect. His speech is normal and behavior is normal. Judgment and thought content normal. Cognition and memory are normal.   Nursing note and vitals reviewed.        CRANIAL  NERVES     CN III, IV, VI   Pupils are equal, round, and reactive to light.  Extraocular motions are normal.        Significant Labs:   CBC:   Recent Labs   Lab  09/18/18   1210   WBC  12.36   HGB  9.1*   HCT  27.4*   PLT  168     CMP:   Recent Labs   Lab  09/18/18   1210   NA  129*   K  4.0   CL  96   CO2  21*   GLU  228*   BUN  26   CREATININE  1.4   CALCIUM  8.4*   PROT  6.5   ALBUMIN  2.9*   BILITOT  0.7   ALKPHOS  51*   AST  50*   ALT  22   ANIONGAP  12   EGFRNONAA  43.3*     Cardiac Markers:   Recent Labs   Lab  09/18/18   1210   BNP  2,054*     Urine Studies: No results for input(s): COLORU, APPEARANCEUA, PHUR, SPECGRAV, PROTEINUA, GLUCUA, KETONESU, BILIRUBINUA, OCCULTUA, NITRITE, UROBILINOGEN, LEUKOCYTESUR, RBCUA, WBCUA, BACTERIA, SQUAMEPITHEL, HYALINECASTS in the last 48 hours.    Invalid input(s): WRIGHTSUR    Significant Imaging: CT: I have reviewed all pertinent results/findings within the past 24 hours and my personal findings are:  Agree with dictation CTs  CXR: I have reviewed all pertinent results/findings within the past 24 hours and my personal findings are:  Chest x-ray with congestive heart failure bilaterally increased interstitial markings and bilateral effusions  EKG: I have reviewed all pertinent results/findings within the past 24 hours and my personal findings are: No acute abnormality.  I have reviewed all pertinent imaging results/findings within the past 24 hours.    Assessment/Plan:     * Acute on chronic congestive heart failure                VTE Risk Mitigation (From admission, onward)        Ordered     Place ALE hose  Until discontinued      09/18/18 1526             Cuauhtemoc Bush MD  Department of Hospital Medicine   [facility] - Med Surg

## 2018-09-18 NOTE — ED TRIAGE NOTES
Patient fell 3 nights ago and hit the back of his head. His right knee gave out and he has been having problems with it. No LOC.

## 2018-09-18 NOTE — SUBJECTIVE & OBJECTIVE
Past Medical History:   Diagnosis Date    Hypertension        Past Surgical History:   Procedure Laterality Date    BIOPSY OF ANORECTAL WALL N/A 2018    Procedure: BIOPSY, ANORECTAL WALL;  Surgeon: Nagi Guo MD;  Location: [facility] OR;  Service: Colon and Rectal;  Laterality: N/A;    BIOPSY, ANORECTAL WALL N/A 2018    Performed by Nagi Guo MD at [facility] OR    DILATION, RECTUM N/A 2018    Performed by Nagi Guo MD at [facility] OR    Exam under anesthesia N/A 2018    Performed by Nagi Guo MD at [facility] OR    EXAMINATION UNDER ANESTHESIA N/A 2018    Procedure: Exam under anesthesia;  Surgeon: Nagi Guo MD;  Location: [facility] OR;  Service: Colon and Rectal;  Laterality: N/A;    hemorrhoidectopm      RECTAL DILATATION N/A 2018    Procedure: DILATION, RECTUM;  Surgeon: Nagi Guo MD;  Location: [facility] OR;  Service: Colon and Rectal;  Laterality: N/A;       Review of patient's allergies indicates:  No Known Allergies    No current facility-administered medications on file prior to encounter.      Current Outpatient Medications on File Prior to Encounter   Medication Sig    terazosin (HYTRIN) 10 MG capsule Take 1 capsule by mouth once daily.    traMADol (ULTRAM) 50 mg tablet Take 50 mg by mouth every 6 (six) hours as needed for Pain.    atenolol (TENORMIN) 25 MG tablet Take 1 tablet by mouth once daily.    lisinopril 10 MG tablet Take 1 tablet by mouth once daily.    oxyCODONE-acetaminophen (PERCOCET) 5-325 mg per tablet Take 1 tablet by mouth every 4 (four) hours as needed for Pain.     Family History     None        Tobacco Use    Smoking status: Former Smoker     Packs/day: 0.25     Types: Cigarettes     Last attempt to quit: 1988     Years since quittin.1    Smokeless tobacco: Never Used   Substance and Sexual Activity    Alcohol use: Not on file    Drug use: Not on file    Sexual activity: Not on file     Review of Systems    Constitutional: Positive for activity change, appetite change, fatigue and unexpected weight change. Negative for chills, diaphoresis and fever.   HENT: Negative for congestion, drooling, hearing loss and trouble swallowing.    Eyes: Negative for pain and visual disturbance.   Respiratory: Positive for shortness of breath. Negative for apnea, cough, choking, chest tightness and wheezing.    Cardiovascular: Negative for chest pain, palpitations and leg swelling.   Gastrointestinal: Negative for abdominal distention, abdominal pain, blood in stool, constipation, diarrhea, nausea and vomiting.   Endocrine: Negative for polydipsia, polyphagia and polyuria.   Genitourinary: Negative for difficulty urinating, dysuria and flank pain.   Musculoskeletal: Positive for arthralgias, back pain and neck pain. Negative for gait problem, joint swelling and neck stiffness.   Skin: Negative for color change, rash and wound.   Allergic/Immunologic: Negative for environmental allergies, food allergies and immunocompromised state.   Neurological: Positive for dizziness, syncope and weakness. Negative for numbness and headaches.   Hematological: Negative for adenopathy.   Psychiatric/Behavioral: Negative for agitation, confusion and sleep disturbance. The patient is not nervous/anxious.      Objective:     Vital Signs (Most Recent):  Temp: 97 °F (36.1 °C) (09/18/18 1510)  Pulse: 107 (09/18/18 1510)  Resp: 16 (09/18/18 1510)  BP: 128/71 (09/18/18 1510)  SpO2: (!) 94 % (09/18/18 1510) Vital Signs (24h Range):  Temp:  [97 °F (36.1 °C)-98.7 °F (37.1 °C)] 97 °F (36.1 °C)  Pulse:  [103-116] 107  Resp:  [16-24] 16  SpO2:  [90 %-96 %] 94 %  BP: (103-128)/(62-78) 128/71     Weight: 76.7 kg (169 lb)  Body mass index is 27.28 kg/m².    Physical Exam   Constitutional: He is oriented to person, place, and time. He appears well-developed and well-nourished.   HENT:   Head: Normocephalic and atraumatic.   Right Ear: External ear normal.   Left Ear:  External ear normal.   Nose: Nose normal.   Eyes: Conjunctivae, EOM and lids are normal. Pupils are equal, round, and reactive to light.   Neck: Trachea normal, normal range of motion and full passive range of motion without pain. Neck supple.   Cardiovascular: Normal rate, regular rhythm, S1 normal, S2 normal, normal heart sounds, intact distal pulses and normal pulses.   Pulmonary/Chest: Effort normal. He has rales.   Rales decreased breath sounds lower bases.   Abdominal: Soft. Normal aorta and bowel sounds are normal.   Musculoskeletal: Normal range of motion.   Neurological: He is alert and oriented to person, place, and time. He has normal reflexes.   Skin: Skin is warm, dry and intact. Capillary refill takes less than 2 seconds.   Psychiatric: He has a normal mood and affect. His speech is normal and behavior is normal. Judgment and thought content normal. Cognition and memory are normal.   Nursing note and vitals reviewed.        CRANIAL NERVES     CN III, IV, VI   Pupils are equal, round, and reactive to light.  Extraocular motions are normal.        Significant Labs:   CBC:   Recent Labs   Lab  09/18/18   1210   WBC  12.36   HGB  9.1*   HCT  27.4*   PLT  168     CMP:   Recent Labs   Lab  09/18/18   1210   NA  129*   K  4.0   CL  96   CO2  21*   GLU  228*   BUN  26   CREATININE  1.4   CALCIUM  8.4*   PROT  6.5   ALBUMIN  2.9*   BILITOT  0.7   ALKPHOS  51*   AST  50*   ALT  22   ANIONGAP  12   EGFRNONAA  43.3*     Cardiac Markers:   Recent Labs   Lab  09/18/18   1210   BNP  2,054*     Urine Studies: No results for input(s): COLORU, APPEARANCEUA, PHUR, SPECGRAV, PROTEINUA, GLUCUA, KETONESU, BILIRUBINUA, OCCULTUA, NITRITE, UROBILINOGEN, LEUKOCYTESUR, RBCUA, WBCUA, BACTERIA, SQUAMEPITHEL, HYALINECASTS in the last 48 hours.    Invalid input(s): YUMIKO    Significant Imaging: CT: I have reviewed all pertinent results/findings within the past 24 hours and my personal findings are:  Agree with dictation  CTs  CXR: I have reviewed all pertinent results/findings within the past 24 hours and my personal findings are:  Chest x-ray with congestive heart failure bilaterally increased interstitial markings and bilateral effusions  EKG: I have reviewed all pertinent results/findings within the past 24 hours and my personal findings are: No acute abnormality.  I have reviewed all pertinent imaging results/findings within the past 24 hours.

## 2018-09-18 NOTE — PLAN OF CARE
09/18/18 1704   Discharge Assessment   Assessment Type Discharge Planning Assessment   Confirmed/corrected address and phone number on facesheet? Yes   Assessment information obtained from? Patient   Expected Length of Stay (days) 2   Communicated expected length of stay with patient/caregiver yes   Prior to hospitilization cognitive status: Alert/Oriented   Prior to hospitalization functional status: Assistive Equipment   Current cognitive status: Alert/Oriented   Current Functional Status: Assistive Equipment   Lives With spouse   Able to Return to Prior Arrangements yes   Is patient able to care for self after discharge? Yes  (with assistance)   Who are your caregiver(s) and their phone number(s)? Sunshine Schulte wife [phone]   Patient's perception of discharge disposition home or selfcare   Readmission Within The Last 30 Days no previous admission in last 30 days   Patient currently being followed by outpatient case management? No   Patient currently receives any other outside agency services? Yes   Name and contact number of agency or person providing outside services [facility]-today was last day    Is it the patient/care giver preference to resume care with the current outside agency? No   Equipment Currently Used at Home grab bar;shower chair;rollator   Do you have any problems affording any of your prescribed medications? No   Is the patient taking medications as prescribed? yes   Does the patient have transportation home? Yes   Transportation Available family or friend will provide   Does the patient receive services at the [facility]? No   Discharge Plan A Home   Discharge Plan B Home Health   Patient/Family In Agreement With Plan yes   Patient lives with his wife at home. States uses a walker at all times to get around. Per wife was getting home health with [facility] but today was their last day & doesn't think they will need home health when discharged but if they do they don't want [facility]. Will continue to follow for discharge needs.

## 2018-09-18 NOTE — HPI
Patient is 89-year-old male.  Dementia is present.  The patient fell approximately a week ago and presents emergency room at this time for workup for pain back right leg.  Noted on chest x-ray to have congestive heart failure.  The patient also complained of shortness of breath after questions.  Patient does not have a previous history of congestive heart failure.  He has been resistant workup in the past.  Patient has no peripheral edema.

## 2018-09-18 NOTE — PLAN OF CARE
Problem: Patient Care Overview  Goal: Plan of Care Review  Outcome: Ongoing (interventions implemented as appropriate)   09/18/18 8272   Coping/Psychosocial   Plan Of Care Reviewed With patient

## 2018-09-18 NOTE — ED PROVIDER NOTES
Encounter Date: 2018       History     Chief Complaint   Patient presents with    Fall     right leg gave away and fell. Head injury     HPI  Review of patient's allergies indicates:  No Known Allergies  Past Medical History:   Diagnosis Date    Hypertension      Past Surgical History:   Procedure Laterality Date    BIOPSY OF ANORECTAL WALL N/A 2018    Procedure: BIOPSY, ANORECTAL WALL;  Surgeon: Nagi Guo MD;  Location: [facility] OR;  Service: Colon and Rectal;  Laterality: N/A;    BIOPSY, ANORECTAL WALL N/A 2018    Performed by Nagi Guo MD at [facility] OR    DILATION, RECTUM N/A 2018    Performed by Nagi Guo MD at [facility] OR    Exam under anesthesia N/A 2018    Performed by Nagi Guo MD at [facility] OR    EXAMINATION UNDER ANESTHESIA N/A 2018    Procedure: Exam under anesthesia;  Surgeon: Nagi Guo MD;  Location: [facility] OR;  Service: Colon and Rectal;  Laterality: N/A;    hemorrhoidectopm      RECTAL DILATATION N/A 2018    Procedure: DILATION, RECTUM;  Surgeon: Nagi Guo MD;  Location: [facility] OR;  Service: Colon and Rectal;  Laterality: N/A;     History reviewed. No pertinent family history.  Social History     Tobacco Use    Smoking status: Former Smoker     Packs/day: 0.25     Types: Cigarettes     Last attempt to quit: 1988     Years since quittin.1    Smokeless tobacco: Never Used   Substance Use Topics    Alcohol use: Not on file    Drug use: Not on file     Review of Systems    Physical Exam     Initial Vitals [18 1141]   BP Pulse Resp Temp SpO2   122/71 (!) 114 (!) 24 98.7 °F (37.1 °C) (!) 92 %      MAP       --         Physical Exam    ED Course   Procedures  Labs Reviewed   CBC W/ AUTO DIFFERENTIAL - Abnormal; Notable for the following components:       Result Value    RBC 2.99 (*)     Hemoglobin 9.1 (*)     Hematocrit 27.4 (*)     Immature Granulocytes 0.6 (*)     Gran # (ANC) 10.6 (*)     Immature Grans (Abs) 0.08 (*)      Lymph # 0.4 (*)     Mono # 1.3 (*)     Gran% 85.9 (*)     Lymph% 3.2 (*)     All other components within normal limits   COMPREHENSIVE METABOLIC PANEL - Abnormal; Notable for the following components:    Sodium 129 (*)     CO2 21 (*)     Glucose 228 (*)     Calcium 8.4 (*)     Albumin 2.9 (*)     Alkaline Phosphatase 51 (*)     AST 50 (*)     eGFR if  50.1 (*)     eGFR if non  43.3 (*)     All other components within normal limits   B-TYPE NATRIURETIC PEPTIDE - Abnormal; Notable for the following components:    BNP 2,054 (*)     All other components within normal limits     EKG Readings: (Independently Interpreted)   Initial Reading: No STEMI. Rhythm: Sinus Tachycardia. Heart Rate: 106. Ectopy: No Ectopy. Conduction: Normal. ST Segments: Normal ST Segments. T Waves: Normal.       Imaging Results          CT Cervical Spine Without Contrast (Final result)  Result time 09/18/18 13:03:23    Final result by Jassi Jhaveri MD (09/18/18 13:03:23)                 Impression:      1. Mild chronic reversal the normal cervical lordosis.  2. Moderate to severe multilevel degenerative disc disease resulting in mild to moderate central spinal canal stenosis with bilateral neural foraminal narrowing.  3. No significant prevertebral soft tissue swelling.  4. Bone demineralization.      Electronically signed by: Jassi Jhaveri  Date:    09/18/2018  Time:    13:03             Narrative:    EXAMINATION:  CT CERVICAL SPINE WITHOUT CONTRAST    CLINICAL HISTORY:  C-spine trauma, NEXUS/CCR positive, low risk;    TECHNIQUE:  Low dose axial images, sagittal and coronal reformations were performed though the cervical spine.  Contrast was not administered.    COMPARISON:  CT cervical spine 10/23/2010.    FINDINGS:  Mild chronic reversal of the normal cervical lordosis.  Cervical vertebral bodies otherwise normal in height and alignment.  No acute fracture or subluxation.  No significant prevertebral soft  tissue swelling.    There is moderate to severe multilevel degenerative disc disease most pronounced within the lower cervical spine.  This results in mild to moderate central spinal canal stenosis with bilateral neural foraminal narrowing.    There is diffuse bone demineralization.    Chronic interstitial change within the left the visualized lung apices.                               CT Head Without Contrast (Final result)  Result time 09/18/18 12:54:16    Final result by Jassi Jhaveri MD (09/18/18 12:54:16)                 Impression:      1. Cortical atrophy with periventricular deep white matter change consistent with chronic small vessel ischemic disease.  2. Dependent mucoperiosteal thickening the right frontal sinus.      Electronically signed by: Jassi Jhaveri  Date:    09/18/2018  Time:    12:54             Narrative:    EXAMINATION:  CT HEAD WITHOUT CONTRAST    CLINICAL HISTORY:  Head trauma, minor, GCS>=13, NOC/NEXUS/CCR neg, first study;    TECHNIQUE:  Low dose axial images were obtained through the head.  Coronal and sagittal reformations were also performed. Contrast was not administered.    COMPARISON:  Head CT 10/23/2010.    FINDINGS:  There is no acute hemorrhage or infarction.  There is cortical atrophy.  There are periventricular deep white matter changes consistent with chronic small vessel ischemic disease.    No extra-axial fluid collections.  Ventricles are normal in size, shape and configuration.  The basal cisterns are patent.    Dependent mucoperiosteal thickening of the right frontal sinus.  The remaining imaged paranasal sinuses and ethmoid air cells are well aerated.    The mastoid air cells and middle ears are normally pneumatized.                               X-Ray Chest AP Portable (Final result)  Result time 09/18/18 13:16:46    Final result by Jassi Jhaveri MD (09/18/18 13:16:46)                 Impression:      1. Findings consistent with congestive heart failure.  2.  Suspected small bilateral pleural effusions.  3. Bone demineralization.  Close interval follow-up is recommended.      Electronically signed by: Jassi Emilio  Date:    09/18/2018  Time:    13:16             Narrative:    EXAMINATION:  XR CHEST AP PORTABLE    CLINICAL HISTORY:  sob;    TECHNIQUE:  Single frontal view of the chest was performed.    COMPARISON:  Chest x-ray 10/23/2008    FINDINGS:  There is cardiomegaly and interstitial edema consistent with congestive heart failure.  There is a suggestion of small bilateral pleural effusions.  No focal consolidation.    Mediastinal contours unremarkable.  Trachea midline.    Moderate glenohumeral degenerative osteoarthrosis.  Bone demineralization.                                                   ED Course as of Sep 18 1805   Tue Sep 18, 2018   1407 Glucose: (!) 228 [WK]      ED Course User Index  [WK] Valentín Silverman MD     Clinical Impression:   The primary encounter diagnosis was Acute on chronic congestive heart failure, unspecified heart failure type. A diagnosis of CHF (congestive heart failure) was also pertinent to this visit.                             Valentín Silverman MD  09/18/18 2602

## 2018-09-18 NOTE — HOSPITAL COURSE
Patient will be placed on CHF protocol Lasix potassium Coreg currently on lisinopril he will have a 2D echo performed.  I and O and weight  will be monitored.  09/19/2018.  Patient had an output of 1300 cc.  Continue diuresis today with Lasix twice and KCl twice.  Patient has atenolol and lisinopril.  CHF protocol.  The patient is no longer short of breath.  2D echo is pending today.  Patient desires to go home at some point will discuss with the wife with a not a or to go to rehab.  The patient has had multiple falls.  He is still cognitively intact. He does have mild-to-moderate dementia.  Lab in a.m..  Continue level of care. Patient agrees to rehab, will consult PT OT and order TB skin test.  09/20/2018.  Patient's BNP today is 1328.  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  Continue diuresis and monitoring of labs.  Will discuss was case management placement of the patient.  Will discuss with the wife findings on the 2D echo in prognosis.  Laboratory values reviewed.  Chest x-rays reviewed.  EKG is reviewed.  Poor long-term prognosis with the patient.  Patient is a 89, will consider referral to Cardiology if possible for catheterization and pacemaker defibrillator if indicated.  This can be done as an outpatient.  09/21/2018.  The patient is up in a chair this morning he is still confused.  I had a long discussion with the wife yesterday we are planning on placing the patient in scale to try to getting up and going again.  Lab values are pending for in a.m..  Continue Lasix potassium Aldactone atenolol lisinopril.  Vital signs heart rate 110 the patient is afebrile blood pressure 120/65 will adjust medications as needed.  Case management social worker working on the case for placement in skilled.  09/22/2018.  The patient is up in a chair eating this morning he is able to swallow without any difficulty this morning.  Patient had a barium swallow yesterday which was  nonproductive they were afraid he would aspirate during the procedure Dr. ortega radiologist suggested the patient have a complete speech evaluation.  Will decrease the patient's of Lasix to 40 p.o. a day DC KCL since his potassium is 5.2 the patient is also on Aldactone.  Will continue current level of care will check lab values Monday morning in anticipation at the patient is going to skilled.  Patient is less confused today and is improving.  However the patient is not completely orientated and judgment is not present.  09/23/2018.  Patient had 500 cc urine output last 24 hr.  Unable to weigh the patient.  Will ask and see if we can't weigh the patient in bed at some point.  Patient have laboratory values in a.m..  Plan is to discharge the patient to [facility] for rehab.  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  Blood pressure has been low which the patient has had in the past.  Will adjust medicines to make sure that his blood pressure improved if possible.  Currently the patient is on low-dose meds.  09/24/2018.  Patient will be discharged [facility] I will be the patient's attending at at .  I will adjust the patient's medications there.  He will need increasing doses of Lasix.  Continues Aldactone continue his atenolol Ultram for pain. Lisinopril will be stopped due to hypotension.  Patient will be monitored closely.

## 2018-09-19 PROBLEM — N18.30 CKD (CHRONIC KIDNEY DISEASE) STAGE 3, GFR 30-59 ML/MIN: Status: ACTIVE | Noted: 2018-09-19

## 2018-09-19 PROBLEM — D53.9 SIMPLE CHRONIC ANEMIA: Status: ACTIVE | Noted: 2018-09-19

## 2018-09-19 PROBLEM — R55 SYNCOPE: Status: ACTIVE | Noted: 2018-09-19

## 2018-09-19 LAB
ANION GAP SERPL CALC-SCNC: 10 MMOL/L
BASOPHILS # BLD AUTO: 0.02 K/UL
BASOPHILS NFR BLD: 0.2 %
BUN SERPL-MCNC: 27 MG/DL
CALCIUM SERPL-MCNC: 8.2 MG/DL
CHLORIDE SERPL-SCNC: 97 MMOL/L
CO2 SERPL-SCNC: 26 MMOL/L
CREAT SERPL-MCNC: 1.2 MG/DL
DIFFERENTIAL METHOD: ABNORMAL
EOSINOPHIL # BLD AUTO: 0 K/UL
EOSINOPHIL NFR BLD: 0.3 %
ERYTHROCYTE [DISTWIDTH] IN BLOOD BY AUTOMATED COUNT: 13.8 %
EST. GFR  (AFRICAN AMERICAN): >60 ML/MIN/1.73 M^2
EST. GFR  (NON AFRICAN AMERICAN): 52.2 ML/MIN/1.73 M^2
GLUCOSE SERPL-MCNC: 133 MG/DL
HCT VFR BLD AUTO: 28.8 %
HGB BLD-MCNC: 9.3 G/DL
IMM GRANULOCYTES # BLD AUTO: 0.05 K/UL
IMM GRANULOCYTES NFR BLD AUTO: 0.4 %
LYMPHOCYTES # BLD AUTO: 0.7 K/UL
LYMPHOCYTES NFR BLD: 6.2 %
MCH RBC QN AUTO: 29.9 PG
MCHC RBC AUTO-ENTMCNC: 32.3 G/DL
MCV RBC AUTO: 93 FL
MONOCYTES # BLD AUTO: 1.4 K/UL
MONOCYTES NFR BLD: 12.4 %
NEUTROPHILS # BLD AUTO: 9.3 K/UL
NEUTROPHILS NFR BLD: 80.5 %
NRBC BLD-RTO: 0 /100 WBC
PLATELET # BLD AUTO: 191 K/UL
PMV BLD AUTO: 11.4 FL
POTASSIUM SERPL-SCNC: 3.8 MMOL/L
RBC # BLD AUTO: 3.11 M/UL
SODIUM SERPL-SCNC: 133 MMOL/L
WBC # BLD AUTO: 11.55 K/UL

## 2018-09-19 PROCEDURE — 96372 THER/PROPH/DIAG INJ SC/IM: CPT

## 2018-09-19 PROCEDURE — 86580 TB INTRADERMAL TEST: CPT | Performed by: INTERNAL MEDICINE

## 2018-09-19 PROCEDURE — G0378 HOSPITAL OBSERVATION PER HR: HCPCS

## 2018-09-19 PROCEDURE — 25000003 PHARM REV CODE 250: Performed by: FAMILY MEDICINE

## 2018-09-19 PROCEDURE — 36415 COLL VENOUS BLD VENIPUNCTURE: CPT

## 2018-09-19 PROCEDURE — 63600175 PHARM REV CODE 636 W HCPCS: Performed by: FAMILY MEDICINE

## 2018-09-19 PROCEDURE — G8978 MOBILITY CURRENT STATUS: HCPCS | Mod: CM

## 2018-09-19 PROCEDURE — 96376 TX/PRO/DX INJ SAME DRUG ADON: CPT

## 2018-09-19 PROCEDURE — 97165 OT EVAL LOW COMPLEX 30 MIN: CPT

## 2018-09-19 PROCEDURE — 27000221 HC OXYGEN, UP TO 24 HOURS

## 2018-09-19 PROCEDURE — 80048 BASIC METABOLIC PNL TOTAL CA: CPT

## 2018-09-19 PROCEDURE — 97162 PT EVAL MOD COMPLEX 30 MIN: CPT

## 2018-09-19 PROCEDURE — 97116 GAIT TRAINING THERAPY: CPT

## 2018-09-19 PROCEDURE — G8979 MOBILITY GOAL STATUS: HCPCS | Mod: CL

## 2018-09-19 PROCEDURE — 25000003 PHARM REV CODE 250: Performed by: INTERNAL MEDICINE

## 2018-09-19 PROCEDURE — 85025 COMPLETE CBC W/AUTO DIFF WBC: CPT

## 2018-09-19 PROCEDURE — 63600175 PHARM REV CODE 636 W HCPCS: Performed by: INTERNAL MEDICINE

## 2018-09-19 RX ORDER — LISINOPRIL 2.5 MG/1
2.5 TABLET ORAL DAILY
Status: DISCONTINUED | OUTPATIENT
Start: 2018-09-19 | End: 2018-09-23

## 2018-09-19 RX ADMIN — ATENOLOL 25 MG: 25 TABLET ORAL at 08:09

## 2018-09-19 RX ADMIN — ENOXAPARIN SODIUM 40 MG: 100 INJECTION SUBCUTANEOUS at 04:09

## 2018-09-19 RX ADMIN — TERAZOSIN HYDROCHLORIDE ANHYDROUS 10 MG: 1 CAPSULE ORAL at 08:09

## 2018-09-19 RX ADMIN — POTASSIUM CHLORIDE 20 MEQ: 1500 TABLET, EXTENDED RELEASE ORAL at 08:09

## 2018-09-19 RX ADMIN — FUROSEMIDE 40 MG: 10 INJECTION, SOLUTION INTRAMUSCULAR; INTRAVENOUS at 05:09

## 2018-09-19 RX ADMIN — FUROSEMIDE 40 MG: 10 INJECTION, SOLUTION INTRAMUSCULAR; INTRAVENOUS at 08:09

## 2018-09-19 RX ADMIN — TUBERCULIN PURIFIED PROTEIN DERIVATIVE 5 UNITS: 5 INJECTION, SOLUTION INTRADERMAL at 09:09

## 2018-09-19 RX ADMIN — LISINOPRIL 2.5 MG: 2.5 TABLET ORAL at 08:09

## 2018-09-19 NOTE — PLAN OF CARE
Problem: Cardiac: Heart Failure (Adult)  Goal: Signs and Symptoms of Listed Potential Problems Will be Absent, Minimized or Managed (Cardiac: Heart Failure)  Signs and symptoms of listed potential problems will be absent, minimized or managed by discharge/transition of care (reference Cardiac: Heart Failure (Adult) CPG).   09/19/18 0322   Cardiac: Heart Failure   Problems Assessed (Heart Failure) respiratory compromise   Problems Present (Heart Failure) decreased quality of life;respiratory compromise

## 2018-09-19 NOTE — NURSING
PT UP IN CHAIR; CHAIR ALARM IN USE, WIFE AT BEDSIDE. INFORMED PT TO NOTIFY NURSE OF ANY CONCERNS/NEEDS. PT VOICED UNDERSTANDING. CL,RN

## 2018-09-19 NOTE — PLAN OF CARE
Problem: Cardiac: Heart Failure (Adult)  Goal: Signs and Symptoms of Listed Potential Problems Will be Absent, Minimized or Managed (Cardiac: Heart Failure)  Signs and symptoms of listed potential problems will be absent, minimized or managed by discharge/transition of care (reference Cardiac: Heart Failure (Adult) CPG).  Outcome: Ongoing (interventions implemented as appropriate)   09/19/18 1206   Cardiac: Heart Failure   Problems Assessed (Heart Failure) fluid/electrolyte imbalance;other (see comments)  (ELEVATED BNP, SOB NOTED )   Problems Present (Heart Failure) fluid/electrolyte imbalance  (ELEVATED BNP; SOB NOTED )

## 2018-09-19 NOTE — PLAN OF CARE
Problem: Patient Care Overview  Goal: Plan of Care Review  Outcome: Ongoing (interventions implemented as appropriate)   09/19/18 2765   Coping/Psychosocial   Plan Of Care Reviewed With patient;spouse

## 2018-09-19 NOTE — PT/OT/SLP EVAL
Occupational Therapy   Evaluation    Name: Kevin Schulte  MRN: [id number]  Admitting Diagnosis:  Acute on chronic congestive heart failure      Recommendations:     Discharge Recommendations:  home health vs SNF  Discharge Equipment Recommendations:   to be determined  Barriers to discharge:   none    History:     Occupational Profile:  Living Environment: Lives in [place] with his wife  Previous level of function: Utilized adaptive equipment in daily tasks  Equipment Used at Home:   grab bars, shower chair, walker  Assistance upon Discharge: above equipment    Past Medical History:   Diagnosis Date    Hypertension        Past Surgical History:   Procedure Laterality Date    BIOPSY OF ANORECTAL WALL N/A 7/30/2018    Procedure: BIOPSY, ANORECTAL WALL;  Surgeon: Nagi Guo MD;  Location: [facility] OR;  Service: Colon and Rectal;  Laterality: N/A;    BIOPSY, ANORECTAL WALL N/A 7/30/2018    Performed by Nagi Guo MD at [facility] OR    DILATION, RECTUM N/A 7/30/2018    Performed by Nagi Guo MD at [facility] OR    Exam under anesthesia N/A 7/30/2018    Performed by Nagi Guo MD at [facility] OR    EXAMINATION UNDER ANESTHESIA N/A 7/30/2018    Procedure: Exam under anesthesia;  Surgeon: Nagi Guo MD;  Location: [facility] OR;  Service: Colon and Rectal;  Laterality: N/A;    hemorrhoidectopm      RECTAL DILATATION N/A 7/30/2018    Procedure: DILATION, RECTUM;  Surgeon: Nagi Guo MD;  Location: [facility] OR;  Service: Colon and Rectal;  Laterality: N/A;       Subjective   Patient reports using equipment at home and performing ADL's with use of walker, grab bar, shower chair. Unable to verify patient's history related to his level of independence.     Chief Complaint: generalized weakness  Patient/Family Comments/goals: No family present. Patient sitting up in chair eating dinner.     Pain/Comfort:  ·  none reported    Patients cultural, spiritual, Sabianist conflicts given the current situation:   "none    Objective:     Communicated with: RN prior to session.  Patient found call button in reach and chair alarm on and   upon OT entry to room.    General Precautions: Standard,     Orthopedic Precautions:  none  Braces:   none    Occupational Performance:    Bed Mobility:    · Activity not performed    Functional Mobility/Transfers:  · Activity not performed    Activities of Daily Living:  · Feeding-Set-up  · Grooming-MIN Assist  · UB dressing-MIN Assist  · LB dressing-MOD Assist  · Bathing-MAX Assist  · Toileting-MAX Assist    Cognitive/Visual Perceptual:  Cognitive/Psychosocial Skills:     -       Follows Commands/attention:Follows one-step commands    Physical Exam:  Upper Extremity Range of Motion:     -       Right Upper Extremity: Deficits: Limited to ~90 degrees AROM Limited to ~90 degrees AROM  -       Left Upper Extremity: Deficits: Limited to ~90 degrees AROM Limited to ~90 degrees AROM B shoulder FF    Education:    Patient left up in chair with chair alarm on    Assessment:     Kevin Schulte is a 89 y.o. male with a medical diagnosis of Acute on chronic congestive heart failure.  He presents with the following performance deficits affecting function:   muscle weakness and unsteady gait     Rehab Prognosis: Good; patient would benefit from acute skilled OT services to address these deficits and reach maximum level of function.         Clinical Decision Makin.  OT Low:  "Pt evaluation falls under low complexity for evaluation coding due to performance deficits noted in 1-3 areas as stated above and 0 co-morbities affecting current functional status. Data obtained from problem focused assessments. No modifications or assistance was required for completion of evaluation. Only brief occupational profile and history review completed."     Plan:     · Patient to be seen  inpatient acute to address the above listed problems via   OT services   · Plan of Care Expires:  at discharge  · Plan of " Care Reviewed with:  patient    This Plan of care has been discussed with the patient who was involved in its development and understands and is in agreement with the identified goals and treatment plan    GOALS:   Patient will perform toileting transfer using RW with CGA  Patient will tolerate up to 10 minutes of functional activity with rest breaks as needed to improve activity tolerance    Time Tracking:     OT Date of Treatment:  09/19/2018  OT Start Time:  500  OT Stop Time:  512  OT Total Time (min):  12    Billable Minutes:Evaluation 12    Stefanie Aguero OT  9/19/2018

## 2018-09-19 NOTE — PLAN OF CARE
Problem: Fall Risk (Adult)  Goal: Absence of Falls  Patient will demonstrate the desired outcomes by discharge/transition of care.  Outcome: Ongoing (interventions implemented as appropriate)   09/19/18 1210   Fall Risk (Adult)   Absence of Falls making progress toward outcome

## 2018-09-19 NOTE — PROGRESS NOTES
[facility]  Progress Note    Patient Name: Kevin Schulte  MRN: [id number]  Patient Class: OP- Observation   Admission Date: 9/18/2018  Length of Stay: 0 days  Attending Physician: Cuauhtemoc Bush MD  Primary Care Provider: Cuauhtemoc Bush MD        Subjective:     Principal Problem:Acute on chronic congestive heart failure    HPI:  Patient is 89-year-old male.  Dementia is present.  The patient fell approximately a week ago and presents emergency room at this time for workup for pain back right leg.  Noted on chest x-ray to have congestive heart failure.  The patient also complained of shortness of breath after questions.  Patient does not have a previous history of congestive heart failure.  He has been resistant workup in the past.  Patient has no peripheral edema.    Hospital Course:  Patient will be placed on CHF protocol Lasix potassium Coreg currently on lisinopril he will have a 2D echo performed.  I and O and weight  will be monitored.  09/19/2018.  Patient had an output of 1300 cc.  Continue diuresis today with Lasix twice and KCl twice.  Patient has atenolol and lisinopril.  CHF protocol.  The patient is no longer short of breath.  2D echo is pending today.  Patient desires to go home at some point will discuss with the wife with a not a or to go to rehab.  The patient has had multiple falls.  He is still cognitively intact. He does have mild-to-moderate dementia.  Lab in a.m..  Continue level of care.    Interval History:  The patient diuresed 1300 cc.  He has improved.  Patient does desire to go home.  Will discuss with wife discharge to rehab.  Do not expect the patient to go home to [facility].  No new complaints today.  Does complain of pain due to the fall.  Discussed x-rays with the patient chronic age changes.  Chest x-ray discussed in light of congestive heart failure.  The patient is aware wished him to stay and have his 2D echo done  today.  Laboratory values discussed with the patient and I surely fully understand the complete discussion.  Lab in a.m..  Continue level of care.    Review of Systems   Constitutional: Positive for activity change, appetite change, fatigue and unexpected weight change. Negative for chills, diaphoresis and fever.   HENT: Negative for congestion, drooling, hearing loss and trouble swallowing.    Eyes: Negative for pain and visual disturbance.   Respiratory: Positive for shortness of breath. Negative for apnea, cough, choking, chest tightness and wheezing.    Cardiovascular: Negative for chest pain, palpitations and leg swelling.   Gastrointestinal: Negative for abdominal distention, abdominal pain, blood in stool, constipation, diarrhea, nausea and vomiting.   Endocrine: Negative for polydipsia, polyphagia and polyuria.   Genitourinary: Negative for difficulty urinating, dysuria and flank pain.   Musculoskeletal: Positive for arthralgias, back pain and neck pain. Negative for gait problem, joint swelling and neck stiffness.   Skin: Negative for color change, rash and wound.   Allergic/Immunologic: Negative for environmental allergies, food allergies and immunocompromised state.   Neurological: Positive for dizziness, syncope and weakness. Negative for numbness and headaches.   Hematological: Negative for adenopathy.   Psychiatric/Behavioral: Negative for agitation, confusion and sleep disturbance. The patient is not nervous/anxious.      Objective:     Vital Signs (Most Recent):  Temp: 96.6 °F (35.9 °C) (09/19/18 0301)  Pulse: 106 (09/19/18 0301)  Resp: 18 (09/19/18 0301)  BP: (!) 108/55 (09/19/18 0301)  SpO2: (!) 94 % (09/19/18 0301) Vital Signs (24h Range):  Temp:  [96.2 °F (35.7 °C)-98.7 °F (37.1 °C)] 96.6 °F (35.9 °C)  Pulse:  [101-116] 106  Resp:  [16-24] 18  SpO2:  [90 %-96 %] 94 %  BP: (103-128)/(55-78) 108/55     Weight: 76.7 kg (169 lb)  Body mass index is 27.28 kg/m².    Intake/Output Summary (Last 24  hours) at 9/19/2018 0619  Last data filed at 9/19/2018 0600  Gross per 24 hour   Intake --   Output 1350 ml   Net -1350 ml      Physical Exam   Constitutional: He is oriented to person, place, and time. He appears well-developed and well-nourished.   HENT:   Head: Normocephalic and atraumatic.   Right Ear: External ear normal.   Left Ear: External ear normal.   Nose: Nose normal.   Eyes: Conjunctivae, EOM and lids are normal. Pupils are equal, round, and reactive to light.   Neck: Trachea normal, normal range of motion and full passive range of motion without pain. Neck supple.   Cardiovascular: Normal rate, regular rhythm, S1 normal, S2 normal, normal heart sounds, intact distal pulses and normal pulses.   Pulmonary/Chest: Effort normal. He has rales.   Abdominal: Soft. Normal aorta and bowel sounds are normal.   Musculoskeletal: Normal range of motion.   Neurological: He is alert and oriented to person, place, and time. He has normal reflexes.   Skin: Skin is warm, dry and intact.   Psychiatric: He has a normal mood and affect. His speech is normal and behavior is normal. Judgment and thought content normal. Cognition and memory are normal.   Nursing note and vitals reviewed.      Significant Labs:   BMP:   Recent Labs   Lab  09/19/18   0443   GLU  133*   NA  133*   K  3.8   CL  97   CO2  26   BUN  27   CREATININE  1.2   CALCIUM  8.2*     CBC:   Recent Labs   Lab  09/18/18   1210  09/19/18   0443   WBC  12.36  11.55   HGB  9.1*  9.3*   HCT  27.4*  28.8*   PLT  168  191       Significant Imaging: CT: I have reviewed all pertinent results/findings within the past 24 hours and my personal findings are:  CT scan of the head negative findings except for H changes.  CXR: I have reviewed all pertinent results/findings within the past 24 hours and my personal findings are:  CT of the cervical spine chronic changes.  Echo: I have reviewed all pertinent results/findings within the past 24 hours and my personal findings are:   Congestive heart failure bilateral effusions  EKG: I have reviewed all pertinent results/findings within the past 24 hours and my personal findings are: No acute abnormalities.  I have reviewed all pertinent imaging results/findings within the past 24 hours.    Assessment/Plan:      * Acute on chronic congestive heart failure                VTE Risk Mitigation (From admission, onward)        Ordered     enoxaparin injection 40 mg  Daily      09/18/18 1721     Place ALE hose  Until discontinued      09/18/18 1526              Cuauhtemoc Bush MD  Department of Hospital Medicine   [facility] - Med Surg

## 2018-09-19 NOTE — PT/OT/SLP EVAL
PhysicalTherapy   Evaluation    Kevin Schulte   MRN: [id number]     PT Received On: 09/19/18  PT Start Time: 1312     PT Stop Time: 1342    PT Total Time (min): 30 min       Billable Minutes:  Evaluation mod  Total Minutes: 30    Diagnosis: Acute on chronic congestive heart failure  Past Medical History:   Diagnosis Date    Hypertension       Past Surgical History:   Procedure Laterality Date    BIOPSY OF ANORECTAL WALL N/A 7/30/2018    Procedure: BIOPSY, ANORECTAL WALL;  Surgeon: Nagi Guo MD;  Location: [facility] OR;  Service: Colon and Rectal;  Laterality: N/A;    BIOPSY, ANORECTAL WALL N/A 7/30/2018    Performed by Nagi Guo MD at [facility] OR    DILATION, RECTUM N/A 7/30/2018    Performed by Nagi Guo MD at [facility] OR    Exam under anesthesia N/A 7/30/2018    Performed by Nagi Guo MD at [facility] OR    EXAMINATION UNDER ANESTHESIA N/A 7/30/2018    Procedure: Exam under anesthesia;  Surgeon: Nagi Guo MD;  Location: [facility] OR;  Service: Colon and Rectal;  Laterality: N/A;    hemorrhoidectopm      RECTAL DILATATION N/A 7/30/2018    Procedure: DILATION, RECTUM;  Surgeon: Nagi Guo MD;  Location: [facility] OR;  Service: Colon and Rectal;  Laterality: N/A;       Referring physician: Eleazar  Date referred to PT: 09/19/18    General Precautions: Standard, fall  Orthopedic Precautions: N/A   Braces: N/A          Patient History:  Equipment Currently Used at Home: rollator, wheelchair, shower chair    DME owned (not currently used): none    Previous Level of Function:   Patient able to perform ADL's with adaptive equipment and assist from spouse. He is a household ambulator using rollator but uses wheelchair for community access.    Subjective:  Communicated with RN prior to session.    Chief Complaint: weakness  Patient goals: increase strength and improve function  Family goals: same    Pain/Comfort  Pain Rating 1: 0/10    Objective:  Patient found sitting up in chair, with Patient  found with: peripheral IV, oxygen    Cognitive Exam:  Oriented to: Person, Place and Situation  Follows Commands/attention: Follows two-step commands  Communication: clear/fluent  Safety awareness/insight to disability: intact    Physical Exam:  Postural examination/scapula alignment: -       Rounded shoulders  -       Forward head  -       Kyphosis    Skin integrity: Visible skin intact  Edema: None noted     Sensation:      -       Intact    Upper Extremity Range of Motion:  Refer to OT evaluation for UE ROM.    Upper Extremity Strength:  Refer to OT evaluation for UE strength.    Lower Extremity Range of Motion:  Right Lower Extremity: WFL  Left Lower Extremity: WFL    Lower Extremity Strength:  Right Lower Extremity: 3+ to 4   Left Lower Extremity: 3+ to 4      Fine motor coordination:  -       Intact    Gross motor coordination: WFL    Functional Mobility:    Bed Mobility :   Supine to sit: Activity did not occur   Sit to supine: Activity did not occur   Rolling: Activity did not occur   Scooting: Activity did not occur    Transfers:  Sit to stand:Moderate Assistance with Rolling Walker and verbal cueing for hand placement and technique    Wheelchair:  Activity did not occur    Gait:  Patient ambulated FWB/WBAT: bilateral lower extremity 15  feet on level tile with Rolling Walker with Minimal Assistance.  Pt with demonstarting a  3-point gait with decreased pamela, increased time in double stance and decreased step length.Impairments contributing to gait deviations include decreased flexibility and decreased strength    Stairs:  Activity did not occur      Balance:   Static Sit: FAIR+: Able to take MINIMAL challenges from all directions  Dynamic Sit:  FAIR+: Maintains balance through MINIMAL excursions of active trunk motion  Static Stand: FAIR: Maintains without assist but unable to take challenges  Dynamic stand: POOR+: Needs MIN (minimal ) assist during gait    Endurance deficit:  SOB with min  exertion    Special Tests:  n/a    Additional Treatment:  Bilateral LE AROM in sitting: heel/toe raises, LAQ's, alt hip flex, standing walking in place    Patient left up in chair with all lines intact, call button in reach, chair alarm on and RN notified.    Assessment:  Kevin Schulte is a 89 y.o. male with a medical diagnosis of Acute on chronic congestive heart failure. He presents with age/illness related weakness, deconditioning, gait/balance instability,and fall risk.    Rehab potential is good.    Activity tolerance: Fair    Plan: transfer training iniated, gait training, balance training and strengthening    Discharge recommendations: (home with home health vs skilled)     Equipment recommendations: none    GOALS:   Multidisciplinary Problems     Physical Therapy Goals        Problem: Physical Therapy Goal    Goal Priority Disciplines Outcome Goal Variances Interventions   Physical Therapy Goal     PT, PT/OT      Description:  Goals to be met by: discharge     Patient will increase functional independence with mobility by performin. Supine to sit with Contact Guard Assistance  2. Sit to supine with Contact Guard Assistance  3. Rolling to Right with Modified [place].  4. Sit to stand transfer with Minimal Assistance  5. Bed to chair transfer with Contact Guard Assistance using Rolling Walker  6. Gait  x 75 feet with Contact Guard Assistance using Rolling Walker.                       PLAN:    Patient to be seen (daily M-F) to address the above listed problems via gait training, therapeutic activities, therapeutic exercises  Plan of Care expires: 18  Plan of Care reviewed with: patient    Functional Limitation: Mobility: Walking and moving around  Mobility: Walking and Moving Around Current Status (): CM  Mobility: Walking and Moving Around Goal Status (): EVERTON Figueredo, PT 2018

## 2018-09-19 NOTE — SUBJECTIVE & OBJECTIVE
Interval History:  The patient diuresed 1300 cc.  He has improved.  Patient does desire to go home.  Will discuss with wife discharge to rehab.  Do not expect the patient to go home to [facility].  No new complaints today.  Does complain of pain due to the fall.  Discussed x-rays with the patient chronic age changes.  Chest x-ray discussed in light of congestive heart failure.  The patient is aware wished him to stay and have his 2D echo done today.  Laboratory values discussed with the patient and I surely fully understand the complete discussion.  Lab in a.m..  Continue level of care.    Review of Systems   Constitutional: Positive for activity change, appetite change, fatigue and unexpected weight change. Negative for chills, diaphoresis and fever.   HENT: Negative for congestion, drooling, hearing loss and trouble swallowing.    Eyes: Negative for pain and visual disturbance.   Respiratory: Positive for shortness of breath. Negative for apnea, cough, choking, chest tightness and wheezing.    Cardiovascular: Negative for chest pain, palpitations and leg swelling.   Gastrointestinal: Negative for abdominal distention, abdominal pain, blood in stool, constipation, diarrhea, nausea and vomiting.   Endocrine: Negative for polydipsia, polyphagia and polyuria.   Genitourinary: Negative for difficulty urinating, dysuria and flank pain.   Musculoskeletal: Positive for arthralgias, back pain and neck pain. Negative for gait problem, joint swelling and neck stiffness.   Skin: Negative for color change, rash and wound.   Allergic/Immunologic: Negative for environmental allergies, food allergies and immunocompromised state.   Neurological: Positive for dizziness, syncope and weakness. Negative for numbness and headaches.   Hematological: Negative for adenopathy.   Psychiatric/Behavioral: Negative for agitation, confusion and sleep disturbance. The patient is not nervous/anxious.      Objective:     Vital Signs (Most  Recent):  Temp: 96.6 °F (35.9 °C) (09/19/18 0301)  Pulse: 106 (09/19/18 0301)  Resp: 18 (09/19/18 0301)  BP: (!) 108/55 (09/19/18 0301)  SpO2: (!) 94 % (09/19/18 0301) Vital Signs (24h Range):  Temp:  [96.2 °F (35.7 °C)-98.7 °F (37.1 °C)] 96.6 °F (35.9 °C)  Pulse:  [101-116] 106  Resp:  [16-24] 18  SpO2:  [90 %-96 %] 94 %  BP: (103-128)/(55-78) 108/55     Weight: 76.7 kg (169 lb)  Body mass index is 27.28 kg/m².    Intake/Output Summary (Last 24 hours) at 9/19/2018 0619  Last data filed at 9/19/2018 0600  Gross per 24 hour   Intake --   Output 1350 ml   Net -1350 ml      Physical Exam   Constitutional: He is oriented to person, place, and time. He appears well-developed and well-nourished.   HENT:   Head: Normocephalic and atraumatic.   Right Ear: External ear normal.   Left Ear: External ear normal.   Nose: Nose normal.   Eyes: Conjunctivae, EOM and lids are normal. Pupils are equal, round, and reactive to light.   Neck: Trachea normal, normal range of motion and full passive range of motion without pain. Neck supple.   Cardiovascular: Normal rate, regular rhythm, S1 normal, S2 normal, normal heart sounds, intact distal pulses and normal pulses.   Pulmonary/Chest: Effort normal. He has rales.   Abdominal: Soft. Normal aorta and bowel sounds are normal.   Musculoskeletal: Normal range of motion.   Neurological: He is alert and oriented to person, place, and time. He has normal reflexes.   Skin: Skin is warm, dry and intact.   Psychiatric: He has a normal mood and affect. His speech is normal and behavior is normal. Judgment and thought content normal. Cognition and memory are normal.   Nursing note and vitals reviewed.      Significant Labs:   BMP:   Recent Labs   Lab  09/19/18   0443   GLU  133*   NA  133*   K  3.8   CL  97   CO2  26   BUN  27   CREATININE  1.2   CALCIUM  8.2*     CBC:   Recent Labs   Lab  09/18/18   1210  09/19/18   0443   WBC  12.36  11.55   HGB  9.1*  9.3*   HCT  27.4*  28.8*   PLT  168  191        Significant Imaging: CT: I have reviewed all pertinent results/findings within the past 24 hours and my personal findings are:  CT scan of the head negative findings except for H changes.  CXR: I have reviewed all pertinent results/findings within the past 24 hours and my personal findings are:  CT of the cervical spine chronic changes.  Echo: I have reviewed all pertinent results/findings within the past 24 hours and my personal findings are:  Congestive heart failure bilateral effusions  EKG: I have reviewed all pertinent results/findings within the past 24 hours and my personal findings are: No acute abnormalities.  I have reviewed all pertinent imaging results/findings within the past 24 hours.

## 2018-09-20 LAB
ANION GAP SERPL CALC-SCNC: 10 MMOL/L
AV VELOCITY RATIO: 0.54
BNP SERPL-MCNC: 1361 PG/ML
BSA FOR ECHO PROCEDURE: 1.89 M2
BUN SERPL-MCNC: 33 MG/DL
CALCIUM SERPL-MCNC: 8.3 MG/DL
CHLORIDE SERPL-SCNC: 97 MMOL/L
CO2 SERPL-SCNC: 28 MMOL/L
CREAT SERPL-MCNC: 1.2 MG/DL
CV ECHO LV RWT: 0.48 CM
DOP CALC AO PEAK VEL: 1.3 M/S
DOP CALC LVOT AREA: 2.27 CM2
DOP CALC LVOT DIAMETER: 1.7 CM
DOP CALC LVOT PEAK VEL: 0.7 M/S
E/A RATIO: 3.25
ECHO LV POSTERIOR WALL: 1.2 CM (ref 0.6–1.1)
EST. GFR  (AFRICAN AMERICAN): >60 ML/MIN/1.73 M^2
EST. GFR  (NON AFRICAN AMERICAN): 52.2 ML/MIN/1.73 M^2
FRACTIONAL SHORTENING: 8 % (ref 28–44)
GLUCOSE SERPL-MCNC: 121 MG/DL
INTERVENTRICULAR SEPTUM: 1.2 CM (ref 0.6–1.1)
LEFT ATRIUM SIZE: 4.4 CM
LEFT INTERNAL DIMENSION IN SYSTOLE: 4.6 CM (ref 2.1–4)
LEFT VENTRICLE MASS INDEX: 123.7 G/M2
LEFT VENTRICULAR INTERNAL DIMENSION IN DIASTOLE: 5 CM (ref 3.5–6)
LEFT VENTRICULAR MASS: 233.75 G
MV PEAK A VEL: 0.36 M/S
MV PEAK E VEL: 1.17 M/S
MV PEAK GRADIENT: 84 MMHG
MV STENOSIS PRESSURE HALF TIME: 22 MS
MV VALVE AREA P 1/2 METHOD: 10 CM2
PISA TR MAX VEL: 3.78 M/S
POTASSIUM SERPL-SCNC: 3.8 MMOL/L
RA PRESSURE: 8 MMHG
SODIUM SERPL-SCNC: 135 MMOL/L
TR MAX PG: 57.15 MMHG
TV REST PULMONARY ARTERY PRESSURE: 65.15 MMHG

## 2018-09-20 PROCEDURE — 63600175 PHARM REV CODE 636 W HCPCS: Performed by: FAMILY MEDICINE

## 2018-09-20 PROCEDURE — 27000221 HC OXYGEN, UP TO 24 HOURS

## 2018-09-20 PROCEDURE — 97116 GAIT TRAINING THERAPY: CPT

## 2018-09-20 PROCEDURE — 80048 BASIC METABOLIC PNL TOTAL CA: CPT

## 2018-09-20 PROCEDURE — 36415 COLL VENOUS BLD VENIPUNCTURE: CPT

## 2018-09-20 PROCEDURE — 97110 THERAPEUTIC EXERCISES: CPT

## 2018-09-20 PROCEDURE — 83880 ASSAY OF NATRIURETIC PEPTIDE: CPT

## 2018-09-20 PROCEDURE — 63600175 PHARM REV CODE 636 W HCPCS: Performed by: INTERNAL MEDICINE

## 2018-09-20 PROCEDURE — 25000003 PHARM REV CODE 250: Performed by: INTERNAL MEDICINE

## 2018-09-20 PROCEDURE — 21400001 HC TELEMETRY ROOM

## 2018-09-20 RX ORDER — TERAZOSIN 1 MG/1
5 CAPSULE ORAL DAILY
Status: DISCONTINUED | OUTPATIENT
Start: 2018-09-20 | End: 2018-09-24 | Stop reason: HOSPADM

## 2018-09-20 RX ORDER — SPIRONOLACTONE 25 MG/1
25 TABLET ORAL DAILY
Status: DISCONTINUED | OUTPATIENT
Start: 2018-09-20 | End: 2018-09-24 | Stop reason: HOSPADM

## 2018-09-20 RX ADMIN — ENOXAPARIN SODIUM 40 MG: 100 INJECTION SUBCUTANEOUS at 05:09

## 2018-09-20 RX ADMIN — FUROSEMIDE 40 MG: 10 INJECTION, SOLUTION INTRAMUSCULAR; INTRAVENOUS at 09:09

## 2018-09-20 RX ADMIN — TERAZOSIN HYDROCHLORIDE ANHYDROUS 5 MG: 1 CAPSULE ORAL at 09:09

## 2018-09-20 RX ADMIN — FUROSEMIDE 40 MG: 10 INJECTION, SOLUTION INTRAMUSCULAR; INTRAVENOUS at 05:09

## 2018-09-20 RX ADMIN — POTASSIUM CHLORIDE 20 MEQ: 1500 TABLET, EXTENDED RELEASE ORAL at 09:09

## 2018-09-20 RX ADMIN — SPIRONOLACTONE 25 MG: 25 TABLET ORAL at 09:09

## 2018-09-20 RX ADMIN — POTASSIUM CHLORIDE 20 MEQ: 1500 TABLET, EXTENDED RELEASE ORAL at 08:09

## 2018-09-20 NOTE — PT/OT/SLP PROGRESS
Physical Therapy         Treatment        Kevin Schulte   MRN: [id number]     PT Received On: 09/20/18           Billable Minutes:  Gait [id number] minutes and Therapeutic Exercise 10 minutes  Total Minutes: 25    Treatment Type: Treatment  PT/PTA: PT             General Precautions: Standard, fall  Orthopedic Precautions: Orthopedic Precautions : N/A   Braces: Braces: N/A         Subjective:  Communicated with RN prior to session.    Pain/Comfort  Pain Rating 1: 0/10    Objective:  Patient found sitting up in chair at bedside, with Patient found with: oxygen    Functional Mobility:  Bed Mobility:   Supine to sit: Activity did not occur   Sit to supine: Minimal Assistance   Rolling: Contact Guard Assistance   Scooting: Contact Guard Assistance    Balance:   Static Sit: GOOD: Takes MODERATE challenges from all directions  Dynamic Sit:  GOOD-: Incosistently Maintains balance through MODERATE excursions of active trunk movement,     Static Stand: FAIR: Maintains without assist but unable to take challenges  Dynamic stand: POOR+: Needs MIN (minimal ) assist during gait    Transfer Training:  Sit to stand:min assist with Rolling Walker verbal cueing for hand placement and technique    Wheelchair Training:  Activity did not occur    Gait Training:  Patient gait trained FWB/WBAT: bilateral lower extremity 12  feet on level tile with Rolling Walker with Contact Guard Assistance.  Pt with demonstarting a  3-point gait and flexed posture with decreased pamela, increased time in double stance, decreased step length, decreased toe-to-floor clearance and decreased weight-shifting ability.Impairments contributing to gait deviations include impaired balance, decreased flexibility, decreased strength and fear of falling    Stair Training:  Activity did not occur      Additional Treatment:  Bilateral seated LE exercises x 10 reps: heel/toe raises, LAQ's, hip flexion, and hip add squeeze with pillow    Activity  Tolerance:  Patient limited by fatigue and SOB    Patient left supine with call button in reach, bed alarm on and RN notified.    Assessment:  Kevin Schulte is a 89 y.o. male with a medical diagnosis of Acute on chronic congestive heart failure. He presents with generalized weakness and deconditioning, h/o falls, decreased balance, impaired sels care skills, and gait instability.    Rehab potential is good.    Activity tolerance: Fair    Discharge recommendations: Discharge Facility/Level Of Care Needs: nursing facility, skilled     Equipment recommendations: Equipment Needed After Discharge: none     GOALS:   Multidisciplinary Problems     Physical Therapy Goals        Problem: Physical Therapy Goal    Goal Priority Disciplines Outcome Goal Variances Interventions   Physical Therapy Goal     PT, PT/OT      Description:  Goals to be met by: discharge     Patient will increase functional independence with mobility by performin. Supine to sit with Contact Guard Assistance  2. Sit to supine with Contact Guard Assistance  3. Rolling to Right with Modified [place].  4. Sit to stand transfer with Minimal Assistance  5. Bed to chair transfer with Contact Guard Assistance using Rolling Walker  6. Gait  x 75 feet with Contact Guard Assistance using Rolling Walker.                       PLAN:    Patient to be seen (daily M-F)  to address the above listed problems via gait training, therapeutic activities, therapeutic exercises  Plan of Care expires: 18  Plan of Care reviewed with: patient, spouse         Caesar Terell, PT 2018

## 2018-09-20 NOTE — PLAN OF CARE
09/20/18 0830   Medicare Message   Important Message from Medicare regarding Discharge Appeal Rights Given to patient/caregiver;Explained to patient/caregiver;Signed/date by patient/caregiver   Date IMM was signed 09/20/18   Time IMM was signed 0830   Patient's wife signed IMM as patient little confused this morning. Wife would like patient to go to skilled at [facility]. Preference form signed. Will continue to follow.

## 2018-09-20 NOTE — PLAN OF CARE
Problem: Patient Care Overview  Goal: Plan of Care Review  Outcome: Ongoing (interventions implemented as appropriate)   09/20/18 3936   Coping/Psychosocial   Plan Of Care Reviewed With patient;spouse

## 2018-09-20 NOTE — SUBJECTIVE & OBJECTIVE
Interval History:  Patient has no new complaints this morning.  Patient is stable.  Will working toward placement.  The BMP is still 1300.  2D echo shows ejection fraction 20% significant cardiomyopathy.  Will discuss with the wife poor prognosis.  At age 89 doubtful that the patient can be referred for aggressive treatment.  Will continue diuresis and monitoring labs.  Will discuss with case management placement today.    Review of Systems   Unable to perform ROS: Acuity of condition   Constitutional: Negative for activity change, appetite change, chills, diaphoresis, fatigue, fever and unexpected weight change.   HENT: Negative for congestion, drooling, hearing loss and trouble swallowing.    Eyes: Negative for pain and visual disturbance.   Respiratory: Negative.  Negative for apnea, cough, choking, chest tightness, shortness of breath and wheezing.    Cardiovascular: Negative for chest pain, palpitations and leg swelling.   Gastrointestinal: Negative for abdominal distention, abdominal pain, blood in stool, constipation, diarrhea, nausea and vomiting.   Endocrine: Negative for polydipsia, polyphagia and polyuria.   Genitourinary: Negative for difficulty urinating, dysuria and flank pain.   Musculoskeletal: Negative for arthralgias, back pain, gait problem, joint swelling, neck pain and neck stiffness.   Skin: Negative for color change, rash and wound.   Allergic/Immunologic: Negative for environmental allergies, food allergies and immunocompromised state.   Neurological: Negative for dizziness, syncope, weakness, numbness and headaches.   Hematological: Negative for adenopathy.   Psychiatric/Behavioral: Negative for agitation, confusion and sleep disturbance. The patient is not nervous/anxious.      Objective:     Vital Signs (Most Recent):  Temp: 97 °F (36.1 °C) (09/19/18 1909)  Pulse: 104 (09/19/18 1909)  Resp: 18 (09/19/18 1909)  BP: (!) 97/53 (09/19/18 1909)  SpO2: 98 % (09/19/18 1909) Vital Signs (24h  Range):  Temp:  [96.2 °F (35.7 °C)-97.2 °F (36.2 °C)] 97 °F (36.1 °C)  Pulse:  [102-127] 104  Resp:  [18-20] 18  SpO2:  [88 %-98 %] 98 %  BP: ()/(52-75) 97/53     Weight: 74.8 kg (164 lb 14.4 oz)  Body mass index is 26.62 kg/m².    Intake/Output Summary (Last 24 hours) at 9/20/2018 0647  Last data filed at 9/20/2018 0000  Gross per 24 hour   Intake 840 ml   Output 1150 ml   Net -310 ml      Physical Exam   Constitutional: He appears well-developed and well-nourished.   HENT:   Head: Normocephalic and atraumatic.   Right Ear: External ear normal.   Left Ear: External ear normal.   Nose: Nose normal.   Eyes: Conjunctivae, EOM and lids are normal. Pupils are equal, round, and reactive to light.   Neck: Trachea normal, normal range of motion and full passive range of motion without pain. Neck supple.   Cardiovascular: Normal rate, regular rhythm, S1 normal, S2 normal, normal heart sounds, intact distal pulses and normal pulses.   Pulmonary/Chest: Effort normal. He has rales.   Abdominal: Soft. Normal aorta and bowel sounds are normal.   Musculoskeletal: Normal range of motion.   Neurological: He is alert. He has normal reflexes.   Patient still has confusion, alert, not orientated.   Skin: Skin is warm, dry and intact.   Psychiatric: He has a normal mood and affect. His speech is normal and behavior is normal. Cognition and memory are normal.   Judgment is impaired secondary to dementia.   Nursing note and vitals reviewed.      Significant Labs:   BMP:   Recent Labs   Lab  09/20/18   0438   GLU  121*   NA  135*   K  3.8   CL  97   CO2  28   BUN  33*   CREATININE  1.2   CALCIUM  8.3*     CBC:   Recent Labs   Lab  09/18/18   1210  09/19/18   0443   WBC  12.36  11.55   HGB  9.1*  9.3*   HCT  27.4*  28.8*   PLT  168  191     Cardiac Markers:   Recent Labs   Lab  09/20/18   0438   BNP  1,361*     All pertinent labs within the past 24 hours have been reviewed.    Significant Imaging: None in the last 24 hr.  See  previous chest x-ray CT of the head and CT of the neck.

## 2018-09-20 NOTE — PLAN OF CARE
09/20/18 0931   Discharge Reassessment   Assessment Type Discharge Planning Reassessment   Discharge plan remains the same: Yes   Provided patient/caregiver education on the expected discharge date and the discharge plan Yes   Discharge Plan A Skilled Nursing Facility   Discharge Plan B Home Health   Change in patient condition or support system No   Patient choice form signed by patient/caregiver Yes   Explained to the the patient/caregiver why the discharge planned changed: Yes   Involved the patient/caregiver in establishing a new discharge plan: Yes   Patient sitting in chair. Wife at bedside. [facility] Dr Bush discussed rehab for her . States only wants to go to [facility]. Preference form signed. Clinicals faxed to Tomasa at [facility]. Waiting on call to see if bed is available.

## 2018-09-20 NOTE — PLAN OF CARE
Problem: Breathing Pattern Ineffective (Adult)  Intervention: Minimize Oxygen Consumption/Demand   09/20/18 0052   Coping/Psychosocial Interventions   Environmental Support calm environment promoted;distractions minimized;environmental consistency promoted;rest periods encouraged;personal routine supported   Activity   Activity Management ambulated to bathroom* - L4   Pain/Comfort/Sleep Interventions   Sleep/Rest Enhancement awakenings minimized;noise level reduced;regular sleep/rest pattern promoted;relaxation techniques promoted

## 2018-09-20 NOTE — PLAN OF CARE
Problem: Breathing Pattern Ineffective (Adult)  Intervention: Monitor/Manage Contributing Psychosocial Factors   09/20/18 0051   Coping/Psychosocial Interventions   Supportive Measures active listening utilized;self-care encouraged;positive reinforcement provided

## 2018-09-20 NOTE — NURSING
PT BACK TO BED. PT TOLERATED WELL. PHYSICAL THERAPY  ASSISTED. PT HAS BEEN IN CHAIR SINCE APPROX 630. PT DID VERY WELL. RESP EVEN ANDU NLABORED. NAD NOTED. BED ALARM ON. [place],RN

## 2018-09-21 PROBLEM — I50.43 CHF (CONGESTIVE HEART FAILURE), NYHA CLASS IV, ACUTE ON CHRONIC, COMBINED: Status: ACTIVE | Noted: 2018-09-18

## 2018-09-21 PROCEDURE — 63600175 PHARM REV CODE 636 W HCPCS: Performed by: INTERNAL MEDICINE

## 2018-09-21 PROCEDURE — 97530 THERAPEUTIC ACTIVITIES: CPT

## 2018-09-21 PROCEDURE — 21400001 HC TELEMETRY ROOM

## 2018-09-21 PROCEDURE — 27000221 HC OXYGEN, UP TO 24 HOURS

## 2018-09-21 PROCEDURE — 97110 THERAPEUTIC EXERCISES: CPT

## 2018-09-21 PROCEDURE — 97116 GAIT TRAINING THERAPY: CPT

## 2018-09-21 PROCEDURE — 63600175 PHARM REV CODE 636 W HCPCS: Performed by: FAMILY MEDICINE

## 2018-09-21 PROCEDURE — 25000003 PHARM REV CODE 250: Performed by: INTERNAL MEDICINE

## 2018-09-21 PROCEDURE — 94760 N-INVAS EAR/PLS OXIMETRY 1: CPT

## 2018-09-21 RX ORDER — ATENOLOL 25 MG/1
50 TABLET ORAL DAILY
Status: DISCONTINUED | OUTPATIENT
Start: 2018-09-21 | End: 2018-09-23

## 2018-09-21 RX ADMIN — ENOXAPARIN SODIUM 40 MG: 100 INJECTION SUBCUTANEOUS at 05:09

## 2018-09-21 RX ADMIN — POTASSIUM CHLORIDE 20 MEQ: 1500 TABLET, EXTENDED RELEASE ORAL at 09:09

## 2018-09-21 RX ADMIN — FUROSEMIDE 40 MG: 10 INJECTION, SOLUTION INTRAMUSCULAR; INTRAVENOUS at 09:09

## 2018-09-21 RX ADMIN — FUROSEMIDE 40 MG: 10 INJECTION, SOLUTION INTRAMUSCULAR; INTRAVENOUS at 06:09

## 2018-09-21 RX ADMIN — LISINOPRIL 2.5 MG: 2.5 TABLET ORAL at 09:09

## 2018-09-21 RX ADMIN — TERAZOSIN HYDROCHLORIDE ANHYDROUS 5 MG: 1 CAPSULE ORAL at 09:09

## 2018-09-21 RX ADMIN — ATENOLOL 50 MG: 25 TABLET ORAL at 09:09

## 2018-09-21 RX ADMIN — SPIRONOLACTONE 25 MG: 25 TABLET ORAL at 09:09

## 2018-09-21 NOTE — ASSESSMENT & PLAN NOTE
09/21/2018.  Cardiomyopathy with ejection fraction of 20%, improving slowly.  Lab values in a.m..  The wife is aware of the diagnosis and poor prognosis.  At present trying to place the patient in rehab in an attempt to get the patient back to baseline.

## 2018-09-21 NOTE — NURSING
"RN SPOKE WITH PTS WIFE, WHO IS IN PTS ROOM AT THIS TIME, CONCERNING PT SWALLOWING DIFFICULTY/PTS WIFE STATED "YES MY  HAS BEEN HAVING TROUBLE SWALLOWING AT HOME. IT STARTED WITH PILLS AT FIRST AND NOW HE SEEMS TO CHOKE ON FOOD AS WELL". MD TO BE NOTIFIED.  "

## 2018-09-21 NOTE — NURSING
CALL PLACED TO DR. LEUNG CONCERNING PTS DIFFICULTY SWALLOWING/MD IS AWARE OF THIS AND HAS SPOKE TO THE PT AND WIFE BEFORE ABOUT HAVING A SWALLOWING STUDY DONE/BUT PT AND WIFE HAVE NOT AGREED TO SCHEDULE ONE. MD ORDERED A BARIUM SWALLOW TO BE DONE TODAY IF POSSIBLE.

## 2018-09-21 NOTE — PLAN OF CARE
Problem: Breathing Pattern Ineffective (Adult)  Intervention: Minimize Oxygen Consumption/Demand   09/21/18 0159   Coping/Psychosocial Interventions   Environmental Support calm environment promoted;rest periods encouraged   Activity   Activity Management ambulated to bathroom* - L4   Pain/Comfort/Sleep Interventions   Sleep/Rest Enhancement awakenings minimized;consistent schedule promoted;noise level reduced

## 2018-09-21 NOTE — PROGRESS NOTES
[facility] Medicine  Progress Note    Patient Name: Kevin Schulte  MRN: [id number]  Patient Class: IP- Inpatient   Admission Date: 9/18/2018  Length of Stay: 1 days  Attending Physician: Cuauhtemoc Bush MD  Primary Care Provider: Cuauhtemoc Bush MD        Subjective:     Principal Problem:CHF (congestive heart failure), NYHA class IV, acute on chronic, combined    HPI:  Patient is 89-year-old male.  Dementia is present.  The patient fell approximately a week ago and presents emergency room at this time for workup for pain back right leg.  Noted on chest x-ray to have congestive heart failure.  The patient also complained of shortness of breath after questions.  Patient does not have a previous history of congestive heart failure.  He has been resistant workup in the past.  Patient has no peripheral edema.    Hospital Course:  Patient will be placed on CHF protocol Lasix potassium Coreg currently on lisinopril he will have a 2D echo performed.  I and O and weight  will be monitored.  09/19/2018.  Patient had an output of 1300 cc.  Continue diuresis today with Lasix twice and KCl twice.  Patient has atenolol and lisinopril.  CHF protocol.  The patient is no longer short of breath.  2D echo is pending today.  Patient desires to go home at some point will discuss with the wife with a not a or to go to rehab.  The patient has had multiple falls.  He is still cognitively intact. He does have mild-to-moderate dementia.  Lab in a.m..  Continue level of care. Patient agrees to rehab, will consult PT OT and order TB skin test.  09/20/2018.  Patient's BNP today is 1328.  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  Continue diuresis and monitoring of labs.  Will discuss was case management placement of the patient.  Will discuss with the wife findings on the 2D echo in prognosis.  Laboratory values reviewed.  Chest x-rays reviewed.  EKG is  reviewed.  Poor long-term prognosis with the patient.  Patient is a 89, will consider referral to Cardiology if possible for catheterization and pacemaker defibrillator if indicated.  This can be done as an outpatient.  09/21/2018.  The patient is up in a chair this morning he is still confused.  I had a long discussion with the wife yesterday we are planning on placing the patient in scale to try to getting up and going again.  Lab values are pending for in a.m..  Continue Lasix potassium Aldactone atenolol lisinopril.  Vital signs heart rate 110 the patient is afebrile blood pressure 120/65 will adjust medications as needed.  Case management social worker working on the case for placement in skilled.    Interval History:  Patient has no new complaints this morning.  Patient is stable.  Will working toward placement.  The BMP is still 1300.  2D echo shows ejection fraction 20% significant cardiomyopathy.  Will discuss with the wife poor prognosis.  At age 89 doubtful that the patient can be referred for aggressive treatment.  Will continue diuresis and monitoring labs.  Will discuss with case management placement today.    Review of Systems   Unable to perform ROS: Acuity of condition   Constitutional: Negative for activity change, appetite change, chills, diaphoresis, fatigue, fever and unexpected weight change.   HENT: Negative for congestion, drooling, hearing loss and trouble swallowing.    Eyes: Negative for pain and visual disturbance.   Respiratory: Negative.  Negative for apnea, cough, choking, chest tightness, shortness of breath and wheezing.    Cardiovascular: Negative for chest pain, palpitations and leg swelling.   Gastrointestinal: Negative for abdominal distention, abdominal pain, blood in stool, constipation, diarrhea, nausea and vomiting.   Endocrine: Negative for polydipsia, polyphagia and polyuria.   Genitourinary: Negative for difficulty urinating, dysuria and flank pain.   Musculoskeletal:  Negative for arthralgias, back pain, gait problem, joint swelling, neck pain and neck stiffness.   Skin: Negative for color change, rash and wound.   Allergic/Immunologic: Negative for environmental allergies, food allergies and immunocompromised state.   Neurological: Negative for dizziness, syncope, weakness, numbness and headaches.   Hematological: Negative for adenopathy.   Psychiatric/Behavioral: Negative for agitation, confusion and sleep disturbance. The patient is not nervous/anxious.      Objective:     Vital Signs (Most Recent):  Temp: 97 °F (36.1 °C) (09/19/18 1909)  Pulse: 104 (09/19/18 1909)  Resp: 18 (09/19/18 1909)  BP: (!) 97/53 (09/19/18 1909)  SpO2: 98 % (09/19/18 1909) Vital Signs (24h Range):  Temp:  [96.2 °F (35.7 °C)-97.2 °F (36.2 °C)] 97 °F (36.1 °C)  Pulse:  [102-127] 104  Resp:  [18-20] 18  SpO2:  [88 %-98 %] 98 %  BP: ()/(52-75) 97/53     Weight: 74.8 kg (164 lb 14.4 oz)  Body mass index is 26.62 kg/m².    Intake/Output Summary (Last 24 hours) at 9/20/2018 0647  Last data filed at 9/20/2018 0000  Gross per 24 hour   Intake 840 ml   Output 1150 ml   Net -310 ml      Physical Exam   Constitutional: He appears well-developed and well-nourished.   HENT:   Head: Normocephalic and atraumatic.   Right Ear: External ear normal.   Left Ear: External ear normal.   Nose: Nose normal.   Eyes: Conjunctivae, EOM and lids are normal. Pupils are equal, round, and reactive to light.   Neck: Trachea normal, normal range of motion and full passive range of motion without pain. Neck supple.   Cardiovascular: Normal rate, regular rhythm, S1 normal, S2 normal, normal heart sounds, intact distal pulses and normal pulses.   Pulmonary/Chest: Effort normal. He has rales.   Abdominal: Soft. Normal aorta and bowel sounds are normal.   Musculoskeletal: Normal range of motion.   Neurological: He is alert. He has normal reflexes.   Patient still has confusion, alert, not orientated.   Skin: Skin is warm, dry and  intact.   Psychiatric: He has a normal mood and affect. His speech is normal and behavior is normal. Cognition and memory are normal.   Judgment is impaired secondary to dementia.   Nursing note and vitals reviewed.      Significant Labs:   BMP:   Recent Labs   Lab  09/20/18   0438   GLU  121*   NA  135*   K  3.8   CL  97   CO2  28   BUN  33*   CREATININE  1.2   CALCIUM  8.3*     CBC:   Recent Labs   Lab  09/18/18   1210  09/19/18   0443   WBC  12.36  11.55   HGB  9.1*  9.3*   HCT  27.4*  28.8*   PLT  168  191     Cardiac Markers:   Recent Labs   Lab  09/20/18   0438   BNP  1,361*     All pertinent labs within the past 24 hours have been reviewed.    Significant Imaging: None in the last 24 hr.  See previous chest x-ray CT of the head and CT of the neck.    Assessment/Plan:      * CHF (congestive heart failure), NYHA class IV, acute on chronic, combined    09/21/2018.  Cardiomyopathy with ejection fraction of 20%, improving slowly.  Lab values in a.m..  The wife is aware of the diagnosis and poor prognosis.  At present trying to place the patient in rehab in an attempt to get the patient back to baseline.          CKD (chronic kidney disease) stage 3, GFR 30-59 ml/min    09/21/2018.  Improvement the mental filtration rate with improvement of congestive heart failure.  Patient has long-term chronic kidney disease stage 3.            VTE Risk Mitigation (From admission, onward)        Ordered     enoxaparin injection 40 mg  Daily      09/18/18 1721     Place ALE hose  Until discontinued      09/18/18 1526              Cuauhtemoc Bush MD  Department of Hospital Medicine   [facility] - Med Surg

## 2018-09-21 NOTE — PLAN OF CARE
09/21/18 1423   Discharge Reassessment   Assessment Type Discharge Planning Reassessment   Discharge plan remains the same: Yes   Provided patient/caregiver education on the expected discharge date and the discharge plan Yes   Discharge Plan A Skilled Nursing Facility   Change in patient condition or support system No   Patient choice form signed by patient/caregiver Yes   Explained to the the patient/caregiver why the discharge planned changed: Yes   Involved the patient/caregiver in establishing a new discharge plan: Yes   Spoke to patient about [facility]. States is calling them to see if they have a private room. Plan is to go on Monday. Denies any other discharge needs at this time.

## 2018-09-21 NOTE — NURSING
RN NOTICED PT HAVING TROUBLE SWALLOWING LIQUIDS WHILE TAKING AM MEDICATIONS. RN INSTRUCTED PT TO TURN HIS HEAD TO THE LEFT WHEN HE SWALLOWED/PT COMPLIED AND SWALLOWED WITHOUT COUGHING. MD TO BE NOTIFIED.  TB SKIN TEST LFA 0MM/-.

## 2018-09-21 NOTE — PT/OT/SLP PROGRESS
Occupational Therapy   Evaluation     Name: Kevin Schulte  MRN: [id number]  Admitting Diagnosis:  Acute on chronic congestive heart failure       Recommendations:      Discharge Recommendations: SNF  Discharge Equipment Recommendations: none  Barriers to discharge:   none     History:      Occupational Profile:  Living Environment: Lives in [place] with his wife  Previous level of function: Utilized adaptive equipment in daily tasks  Equipment Used at Home:   grab bars, shower chair, walker  Assistance upon Discharge: above equipment          Past Medical History:   Diagnosis Date    Hypertension                Past Surgical History:   Procedure Laterality Date    BIOPSY OF ANORECTAL WALL N/A 7/30/2018     Procedure: BIOPSY, ANORECTAL WALL;  Surgeon: Nagi Guo MD;  Location: [facility] OR;  Service: Colon and Rectal;  Laterality: N/A;    BIOPSY, ANORECTAL WALL N/A 7/30/2018     Performed by Nagi Guo MD at [facility] OR    DILATION, RECTUM N/A 7/30/2018     Performed by Nagi Guo MD at [facility] OR    Exam under anesthesia N/A 7/30/2018     Performed by Nagi Guo MD at [facility] OR    EXAMINATION UNDER ANESTHESIA N/A 7/30/2018     Procedure: Exam under anesthesia;  Surgeon: Nagi Guo MD;  Location: [facility] OR;  Service: Colon and Rectal;  Laterality: N/A;    hemorrhoidectopm        RECTAL DILATATION N/A 7/30/2018     Procedure: DILATION, RECTUM;  Surgeon: Nagi Guo MD;  Location: [facility] OR;  Service: Colon and Rectal;  Laterality: N/A;         Subjective   Patient and wife report that patient utilized a walker at home for mobility and indicated that her performed ADL's modified independent (use of AE).      Chief Complaint: generalized weakness  Patient/Family Comments/goals: No family present. Patient sitting up in chair eating dinner.     Pain/Comfort:  ·  none reported     Patients cultural, spiritual, [employer]ish conflicts given the current situation:  none     Objective:       Communicated with: RN prior to session.  Patient found call button in reach and chair alarm on and   upon OT entry to room.     General Precautions: Standard,     Orthopedic Precautions:  none  Braces:   none     Occupational Performance:     Bed Mobility:    · Scooting up edge of bed MIN Assist       Functional Mobility/Transfers:  · Patient performed sit to stand MOD Assist using RW requiring MAX verbal and physical cues for procedure and proper hand placement. Patient demonstrates increased confusion in treatment. Patient performed transfer from chair to bedside requiring MAX Verbal cues and tactile cues to turn/back up accordingly with MOD Assist.      Activities of Daily Living:  · Feeding-Set-up  · Grooming-MIN Assist  · UB dressing-MIN Assist  · LB dressing-MOD Assist  · Bathing-MAX Assist  · Toileting-MAX Assist     Cognitive/Visual Perceptual:  Cognitive/Psychosocial Skills:     -       Follows Commands/attention:Follows one-step commands     Physical Exam:  Upper Extremity Range of Motion:     -       Right Upper Extremity: Deficits: Limited to ~90 degrees AROM Limited to ~90 degrees AROM  -       Left Upper Extremity: Deficits: Limited to ~90 degrees AROM Limited to ~90 degrees AROM B shoulder FF     Treatment:   Patient performed sit to stand using RW MOD Assist with MAX Verbal and Tactile cues for procedure and hand placement in transfer using arm rests on chair. Patient requires MOD Assist using RW in stepping backwards and turning using RW. Patient transferred to EOB with MAX cues for successful hand placement onto bed edge. Patient performed scooting up edge of bed MIN Assist. Patient transferred to supine with SBA. Patient then participated in bridging with support to LE knees x 10 reps. Patient then performed scooting up in bed with support of LE's with small scoots completed.      Patient left with HOB elevated, bed alarm on, O2 intact, and caregiver present. Notified Ashli, that patient is  back in bed.      Assessment:      Kevin Schulte is a 89 y.o. male with a medical diagnosis of Acute on chronic congestive heart failure.  He presents with the following performance deficits affecting function:   muscle weakness and unsteady gait in addition to decreased mental status      Rehab Prognosis: Good; patient would benefit from acute skilled OT services to address these deficits and reach maximum level of function.             Plan:      ? Patient to be seen  inpatient acute to address the above listed problems via   OT services   ? Plan of Care Expires:  at discharge  ? Plan of Care Reviewed with:  patient     This Plan of care has been discussed with the patient who was involved in its development and understands and is in agreement with the identified goals and treatment plan     GOALS:   Patient will perform toileting transfer using RW with CGA  Patient will tolerate up to 10 minutes of functional activity with rest breaks as needed to improve activity tolerance     Time Tracking:      OT Date of Treatment:  09/21/2018  OT Start Time:  135  OT Stop Time:  150  OT Total Time (min):  15     Billable Minutes: Therapeutic Activity 15     Stefanie Aguero OT

## 2018-09-21 NOTE — ASSESSMENT & PLAN NOTE
09/21/2018.  Improvement the mental filtration rate with improvement of congestive heart failure.  Patient has long-term chronic kidney disease stage 3.

## 2018-09-21 NOTE — PLAN OF CARE
Problem: Patient Care Overview  Goal: Plan of Care Review  PT IS WEARING 2 LPM, PER NASAL CANNULA.   PULSE OX IS 99% ON O2. .

## 2018-09-21 NOTE — PT/OT/SLP PROGRESS
Physical Therapy         Treatment        Kevin Schulte   MRN: [id number]     PT Received On: 09/21/18           Billable Minutes:  Gait [id number] minutes and Therapeutic Exercise 10 minutes  Total Minutes: 25    Treatment Type: Treatment  PT/PTA: PT             General Precautions: Standard, fall  Orthopedic Precautions: Orthopedic Precautions : N/A   Braces: Braces: N/A         Subjective:  Communicated with RN prior to session.    Pain/Comfort  Pain Rating 1: 0/10    Objective:  Patient found sitting up in chair at bedside, with Patient found with: oxygen    Functional Mobility:  Bed Mobility:   Supine to sit: Activity did not occur   Sit to supine: Activity did not occur   Rolling: Activity did not occur   Scooting: Contact Guard Assistance to scoot to edge of chair    Balance:   Static Sit: GOOD: Takes MODERATE challenges from all directions  Dynamic Sit:  GOOD-: Incosistently Maintains balance through MODERATE excursions of active trunk movement,     Static Stand: FAIR: Maintains without assist but unable to take challenges  Dynamic stand: FAIR: Needs CONTACT GUARD during gait    Transfer Training:  Sit to stand:min assist with Rolling Walker verbal cueing for hand placement and technique    Wheelchair Training:  Activity did not occur    Gait Training:  Patient gait trained FWB/WBAT: bilateral lower extremity 60  feet on level tile with Rolling Walker with Contact Guard Assistance.  Pt with demonstarting a  3-point gait and flexed posture with decreased pamela, decreased step length, decreased toe-to-floor clearance and decreased weight-shifting ability.Impairments contributing to gait deviations include impaired balance, decreased flexibility, decreased strength and fear of falling    Stair Training:  Activity did not occur      Additional Treatment:  Bilateral seated LE exercises x 10 reps: heel/toe raises, LAQ's, hip flexion, and hip add squeeze with pillow    Activity Tolerance:  Patient limited by  fatigue and SOB    Patient left up in chair with call button in reach, chair alarm on and RN notified.    Assessment:  Kevin Schulte is a 89 y.o. male with a medical diagnosis of CHF (congestive heart failure), NYHA class IV, acute on chronic, combined. He presents with generalized weakness and deconditioning, h/o falls, decreased balance, impaired sels care skills, and gait instability. Patient appearing more confused today, he is adamant about wanting to use his own walker (rollator) and not using facility walker. He required a lot of coaxing and reassurance for participation today. Activity tolerance improving daily. Patient will benefit from continued physical therapy following discharge from hospital.    Rehab potential is good.    Activity tolerance: Fair+    Discharge recommendations: Discharge Facility/Level Of Care Needs: nursing facility, skilled     Equipment recommendations: Equipment Needed After Discharge: none     GOALS:   Multidisciplinary Problems     Physical Therapy Goals        Problem: Physical Therapy Goal    Goal Priority Disciplines Outcome Goal Variances Interventions   Physical Therapy Goal     PT, PT/OT      Description:  Goals to be met by: discharge     Patient will increase functional independence with mobility by performin. Supine to sit with Contact Guard Assistance  2. Sit to supine with Contact Guard Assistance  3. Rolling to Right with Modified [place].  4. Sit to stand transfer with Minimal Assistance  5. Bed to chair transfer with Contact Guard Assistance using Rolling Walker  6. Gait  x 75 feet with Contact Guard Assistance using Rolling Walker.                       PLAN:    Patient to be seen (daily M-F)  to address the above listed problems via gait training, therapeutic exercises, therapeutic activities  Plan of Care expires: 18  Plan of Care reviewed with: patient         Caesar Terell, PT 2018

## 2018-09-22 LAB
ANION GAP SERPL CALC-SCNC: 10 MMOL/L
BASOPHILS # BLD AUTO: 0.03 K/UL
BASOPHILS NFR BLD: 0.3 %
BNP SERPL-MCNC: 1615 PG/ML
BUN SERPL-MCNC: 47 MG/DL
CALCIUM SERPL-MCNC: 8.3 MG/DL
CHLORIDE SERPL-SCNC: 98 MMOL/L
CO2 SERPL-SCNC: 28 MMOL/L
CREAT SERPL-MCNC: 1.6 MG/DL
DIFFERENTIAL METHOD: ABNORMAL
EOSINOPHIL # BLD AUTO: 0 K/UL
EOSINOPHIL NFR BLD: 0.4 %
ERYTHROCYTE [DISTWIDTH] IN BLOOD BY AUTOMATED COUNT: 13.8 %
EST. GFR  (AFRICAN AMERICAN): 42.6 ML/MIN/1.73 M^2
EST. GFR  (NON AFRICAN AMERICAN): 36.9 ML/MIN/1.73 M^2
GLUCOSE SERPL-MCNC: 117 MG/DL
HCT VFR BLD AUTO: 31 %
HGB BLD-MCNC: 10.1 G/DL
IMM GRANULOCYTES # BLD AUTO: 0.04 K/UL
IMM GRANULOCYTES NFR BLD AUTO: 0.4 %
LYMPHOCYTES # BLD AUTO: 1.1 K/UL
LYMPHOCYTES NFR BLD: 9.5 %
MCH RBC QN AUTO: 30.1 PG
MCHC RBC AUTO-ENTMCNC: 32.6 G/DL
MCV RBC AUTO: 93 FL
MONOCYTES # BLD AUTO: 1.5 K/UL
MONOCYTES NFR BLD: 13 %
NEUTROPHILS # BLD AUTO: 8.5 K/UL
NEUTROPHILS NFR BLD: 76.4 %
NRBC BLD-RTO: 0 /100 WBC
PLATELET # BLD AUTO: 293 K/UL
PMV BLD AUTO: 10.8 FL
POTASSIUM SERPL-SCNC: 5.2 MMOL/L
RBC # BLD AUTO: 3.35 M/UL
SODIUM SERPL-SCNC: 136 MMOL/L
WBC # BLD AUTO: 11.15 K/UL

## 2018-09-22 PROCEDURE — 63600175 PHARM REV CODE 636 W HCPCS: Performed by: INTERNAL MEDICINE

## 2018-09-22 PROCEDURE — 85025 COMPLETE CBC W/AUTO DIFF WBC: CPT

## 2018-09-22 PROCEDURE — 36415 COLL VENOUS BLD VENIPUNCTURE: CPT

## 2018-09-22 PROCEDURE — 21400001 HC TELEMETRY ROOM

## 2018-09-22 PROCEDURE — 94760 N-INVAS EAR/PLS OXIMETRY 1: CPT

## 2018-09-22 PROCEDURE — 27000221 HC OXYGEN, UP TO 24 HOURS

## 2018-09-22 PROCEDURE — 25000003 PHARM REV CODE 250: Performed by: INTERNAL MEDICINE

## 2018-09-22 PROCEDURE — 80048 BASIC METABOLIC PNL TOTAL CA: CPT

## 2018-09-22 PROCEDURE — 83880 ASSAY OF NATRIURETIC PEPTIDE: CPT

## 2018-09-22 RX ORDER — FUROSEMIDE 20 MG/1
40 TABLET ORAL DAILY
Status: DISCONTINUED | OUTPATIENT
Start: 2018-09-22 | End: 2018-09-23

## 2018-09-22 RX ADMIN — SPIRONOLACTONE 25 MG: 25 TABLET ORAL at 09:09

## 2018-09-22 RX ADMIN — LISINOPRIL 2.5 MG: 2.5 TABLET ORAL at 09:09

## 2018-09-22 RX ADMIN — FUROSEMIDE 40 MG: 20 TABLET ORAL at 09:09

## 2018-09-22 RX ADMIN — ATENOLOL 50 MG: 25 TABLET ORAL at 09:09

## 2018-09-22 RX ADMIN — ENOXAPARIN SODIUM 40 MG: 100 INJECTION SUBCUTANEOUS at 05:09

## 2018-09-22 RX ADMIN — TERAZOSIN HYDROCHLORIDE ANHYDROUS 5 MG: 1 CAPSULE ORAL at 09:09

## 2018-09-22 NOTE — SUBJECTIVE & OBJECTIVE
Interval History:  Patient had a good night last p.m..  He said in the chair eating this morning very alert and talkative.  He is still confused.  He has no difficulty with eating this morning.  Attempted barium swallow yesterday which was unable to do.  Radiology was afraid the patient might aspirate suggested that possibly the patient have a complete study evaluation and video fluoroscopy.  Will consider this once the patient is placed in [facility] if needed.  This morning the patient is having no problem eating his diet which is scrambled eggs grits and is swallowing his liquids well.  Will continue current management is congestive heart failures improved the patient be on p.o. medications at this time.  Potassium is 5.2 will DC potassium supplement this time.  Laboratory values [place] [facility] [place]  Review of Systems   Constitutional: Negative for activity change, appetite change, chills, diaphoresis, fatigue, fever and unexpected weight change.   HENT: Negative for congestion, drooling, hearing loss and trouble swallowing.    Eyes: Negative for pain and visual disturbance.   Respiratory: Negative.  Negative for apnea, cough, choking, chest tightness, shortness of breath and wheezing.    Cardiovascular: Negative for chest pain, palpitations and leg swelling.   Gastrointestinal: Negative for abdominal distention, abdominal pain, blood in stool, constipation, diarrhea, nausea and vomiting.   Endocrine: Negative for polydipsia, polyphagia and polyuria.   Genitourinary: Negative for difficulty urinating, dysuria and flank pain.   Musculoskeletal: Negative for arthralgias, back pain, gait problem, joint swelling, neck pain and neck stiffness.   Skin: Negative for color change, rash and wound.   Allergic/Immunologic: Negative for environmental allergies, food allergies and immunocompromised state.   Neurological: Negative for dizziness, syncope, weakness, numbness and  headaches.   Hematological: Negative for adenopathy.   Psychiatric/Behavioral: Negative for agitation, confusion and sleep disturbance. The patient is not nervous/anxious.      Objective:     Vital Signs (Most Recent):  Temp: 96.7 °F (35.9 °C) (09/22/18 0709)  Pulse: 76 (09/22/18 0816)  Resp: 17 (09/22/18 0709)  BP: (!) 91/55 (09/22/18 0709)  SpO2: 98 % (09/22/18 0816) Vital Signs (24h Range):  Temp:  [96.4 °F (35.8 °C)-97.8 °F (36.6 °C)] 96.7 °F (35.9 °C)  Pulse:  [] 76  Resp:  [16-22] 17  SpO2:  [90 %-100 %] 98 %  BP: ()/(50-59) 91/55     Weight: 74.8 kg (164 lb 14.4 oz)  Body mass index is 26.62 kg/m².    Intake/Output Summary (Last 24 hours) at 9/22/2018 0859  Last data filed at 9/22/2018 0848  Gross per 24 hour   Intake 120 ml   Output --   Net 120 ml      Physical Exam   Constitutional: He appears well-developed and well-nourished.   HENT:   Head: Normocephalic and atraumatic.   Right Ear: External ear normal.   Left Ear: External ear normal.   Nose: Nose normal.   Eyes: Conjunctivae, EOM and lids are normal. Pupils are equal, round, and reactive to light.   Neck: Trachea normal, normal range of motion and full passive range of motion without pain. Neck supple.   Cardiovascular: Normal rate, regular rhythm, S1 normal, S2 normal, normal heart sounds, intact distal pulses and normal pulses.   Pulmonary/Chest: Effort normal and breath sounds normal.   Abdominal: Soft. Normal aorta and bowel sounds are normal.   Musculoskeletal: Normal range of motion.   Neurological: He is alert. He has normal reflexes.   Skin: Skin is warm, dry and intact.   Psychiatric: He has a normal mood and affect. His speech is normal and behavior is normal. Cognition and memory are normal.   Nursing note and vitals reviewed.      Significant Labs:   BMP:   Recent Labs   Lab  09/22/18 0719   GLU  117*   NA  136   K  5.2*   CL  98   CO2  28   BUN  47*   CREATININE  1.6*   CALCIUM  8.3*     CBC:   Recent Labs   Lab  09/22/18    0719   WBC  11.15   HGB  10.1*   HCT  31.0*   PLT  293     Cardiac Markers:   Recent Labs   Lab  09/22/18   0719   BNP  1,615*       Significant Imaging: None

## 2018-09-22 NOTE — PLAN OF CARE
Problem: Cardiac: Heart Failure (Adult)  Intervention: Support Psychosocial Response to Heart Failure   09/22/18 0554   Coping/Psychosocial Interventions   Supportive Measures active listening utilized   Psychosocial Support   Family/Support System Care presence promoted

## 2018-09-22 NOTE — PLAN OF CARE
Problem: Pressure Ulcer Risk ([place] Scale) (Adult,Obstetrics,Pediatric)  Intervention: Turn/Reposition Often   09/22/18 0553   Positioning   Body Position weight shift assistance provided   Skin Interventions   Pressure Reduction Techniques rest period provided between sit times;sit time limited to 1 hour;weight shift assistance provided

## 2018-09-22 NOTE — PROGRESS NOTES
[facility] Medicine  Progress Note    Patient Name: Kevin Schulte  MRN: [id number]  Patient Class: IP- Inpatient   Admission Date: 9/18/2018  Length of Stay: 2 days  Attending Physician: Cuauhtemoc Bush MD  Primary Care Provider: Cuauhtemoc Bush MD        Subjective:     Principal Problem:CHF (congestive heart failure), NYHA class IV, acute on chronic, combined    HPI:  Patient is 89-year-old male.  Dementia is present.  The patient fell approximately a week ago and presents emergency room at this time for workup for pain back right leg.  Noted on chest x-ray to have congestive heart failure.  The patient also complained of shortness of breath after questions.  Patient does not have a previous history of congestive heart failure.  He has been resistant workup in the past.  Patient has no peripheral edema.    Hospital Course:  Patient will be placed on CHF protocol Lasix potassium Coreg currently on lisinopril he will have a 2D echo performed.  I and O and weight  will be monitored.  09/19/2018.  Patient had an output of 1300 cc.  Continue diuresis today with Lasix twice and KCl twice.  Patient has atenolol and lisinopril.  CHF protocol.  The patient is no longer short of breath.  2D echo is pending today.  Patient desires to go home at some point will discuss with the wife with a not a or to go to rehab.  The patient has had multiple falls.  He is still cognitively intact. He does have mild-to-moderate dementia.  Lab in a.m..  Continue level of care. Patient agrees to rehab, will consult PT OT and order TB skin test.  09/20/2018.  Patient's BNP today is 1328.  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  Continue diuresis and monitoring of labs.  Will discuss was case management placement of the patient.  Will discuss with the wife findings on the 2D echo in prognosis.  Laboratory values reviewed.  Chest x-rays reviewed.  EKG is  reviewed.  Poor long-term prognosis with the patient.  Patient is a 89, will consider referral to Cardiology if possible for catheterization and pacemaker defibrillator if indicated.  This can be done as an outpatient.  09/21/2018.  The patient is up in a chair this morning he is still confused.  I had a long discussion with the wife yesterday we are planning on placing the patient in scale to try to getting up and going again.  Lab values are pending for in a.m..  Continue Lasix potassium Aldactone atenolol lisinopril.  Vital signs heart rate 110 the patient is afebrile blood pressure 120/65 will adjust medications as needed.  Case management social worker working on the case for placement in skilled.  09/22/2018.  The patient is up in a chair eating this morning he is able to swallow without any difficulty this morning.  Patient had a barium swallow yesterday which was nonproductive they were afraid he would aspirate during the procedure Dr. ortega radiologist suggested the patient have a complete speech evaluation.  Will decrease the patient's of Lasix to 40 p.o. a day DC KCL since his potassium is 5.2 the patient is also on Aldactone.  Will continue current level of care will check lab values Monday morning in anticipation at the patient is going to skilled.  Patient is less confused today and is improving.  However the patient is not completely orientated and judgment is not present.    Interval History:  Patient had a good night last p.m..  He said in the chair eating this morning very alert and talkative.  He is still confused.  He has no difficulty with eating this morning.  Attempted barium swallow yesterday which was unable to do.  Radiology was afraid the patient might aspirate suggested that possibly the patient have a complete study evaluation and video fluoroscopy.  Will consider this once the patient is placed in skilled if needed.  This morning the patient is having no problem eating his diet which  is scrambled eggs grits and is swallowing his liquids well.  Will continue current management is congestive heart failures improved the patient be on p.o. medications at this time.  Potassium is 5.2 will DC potassium supplement this time.  Laboratory values Monday prior to the patient going to [facility].  Review of Systems   Constitutional: Negative for activity change, appetite change, chills, diaphoresis, fatigue, fever and unexpected weight change.   HENT: Negative for congestion, drooling, hearing loss and trouble swallowing.    Eyes: Negative for pain and visual disturbance.   Respiratory: Negative.  Negative for apnea, cough, choking, chest tightness, shortness of breath and wheezing.    Cardiovascular: Negative for chest pain, palpitations and leg swelling.   Gastrointestinal: Negative for abdominal distention, abdominal pain, blood in stool, constipation, diarrhea, nausea and vomiting.   Endocrine: Negative for polydipsia, polyphagia and polyuria.   Genitourinary: Negative for difficulty urinating, dysuria and flank pain.   Musculoskeletal: Negative for arthralgias, back pain, gait problem, joint swelling, neck pain and neck stiffness.   Skin: Negative for color change, rash and wound.   Allergic/Immunologic: Negative for environmental allergies, food allergies and immunocompromised state.   Neurological: Negative for dizziness, syncope, weakness, numbness and headaches.   Hematological: Negative for adenopathy.   Psychiatric/Behavioral: Negative for agitation, confusion and sleep disturbance. The patient is not nervous/anxious.      Objective:     Vital Signs (Most Recent):  Temp: 96.7 °F (35.9 °C) (09/22/18 0709)  Pulse: 76 (09/22/18 0816)  Resp: 17 (09/22/18 0709)  BP: (!) 91/55 (09/22/18 0709)  SpO2: 98 % (09/22/18 0816) Vital Signs (24h Range):  Temp:  [96.4 °F (35.8 °C)-97.8 °F (36.6 °C)] 96.7 °F (35.9 °C)  Pulse:  [] 76  Resp:  [16-22] 17  SpO2:  [90 %-100 %] 98 %  BP:  ()/(50-59) 91/55     Weight: 74.8 kg (164 lb 14.4 oz)  Body mass index is 26.62 kg/m².    Intake/Output Summary (Last 24 hours) at 9/22/2018 0859  Last data filed at 9/22/2018 0848  Gross per 24 hour   Intake 120 ml   Output --   Net 120 ml      Physical Exam   Constitutional: He appears well-developed and well-nourished.   HENT:   Head: Normocephalic and atraumatic.   Right Ear: External ear normal.   Left Ear: External ear normal.   Nose: Nose normal.   Eyes: Conjunctivae, EOM and lids are normal. Pupils are equal, round, and reactive to light.   Neck: Trachea normal, normal range of motion and full passive range of motion without pain. Neck supple.   Cardiovascular: Normal rate, regular rhythm, S1 normal, S2 normal, normal heart sounds, intact distal pulses and normal pulses.   Pulmonary/Chest: Effort normal and breath sounds normal.   Abdominal: Soft. Normal aorta and bowel sounds are normal.   Musculoskeletal: Normal range of motion.   Neurological: He is alert. He has normal reflexes.   Skin: Skin is warm, dry and intact.   Psychiatric: He has a normal mood and affect. His speech is normal and behavior is normal. Cognition and memory are normal.   Nursing note and vitals reviewed.      Significant Labs:   BMP:   Recent Labs   Lab  09/22/18   0719   GLU  117*   NA  136   K  5.2*   CL  98   CO2  28   BUN  47*   CREATININE  1.6*   CALCIUM  8.3*     CBC:   Recent Labs   Lab  09/22/18   0719   WBC  11.15   HGB  10.1*   HCT  31.0*   PLT  293     Cardiac Markers:   Recent Labs   Lab  09/22/18   0719   BNP  1,615*       Significant Imaging: None    Assessment/Plan:      * CHF (congestive heart failure), NYHA class IV, acute on chronic, combined    09/21/2018.  Cardiomyopathy with ejection fraction of 20%, improving slowly.  Lab values in a.m..  The wife is aware of the diagnosis and poor prognosis.  At present trying to place the patient in rehab in an attempt to get the patient back to baseline.  09/22/2018.   Patient continues to improve.  Will change IV Lasix to p.o. and DC potassium with potassium being 5.2.  Patient is on Aldactone.  Mag p.o. will be given.  The patient is improved from congestive heart failure.  The plan is to DC the patient to [facility] Monday to [facility].  See above dictation on attempted barium swallow study.        CKD (chronic kidney disease) stage 3, GFR 30-59 ml/min    09/21/2018.  Improvement the mental filtration rate with improvement of congestive heart failure.  Patient has long-term chronic kidney disease stage 3.  09/22/2018.  Patient continues to improve his glomerular filtration rate with improvement of congestive heart failure.  Continue current management.          Simple chronic anemia    Patient's H&H is stable.  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  The patient is age 89 consideration for poor bone marrow response and even for myelofibrosis no current workup planned at this time.            VTE Risk Mitigation (From admission, onward)        Ordered     enoxaparin injection 40 mg  Daily      09/18/18 1721     Place ALE hose  Until discontinued      09/18/18 1526              Cuauhtemoc Bush MD  Department of Hospital Medicine   [facility] - Med Surg

## 2018-09-22 NOTE — ASSESSMENT & PLAN NOTE
Patient's H&H is stable.  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  The patient is age 89 consideration for poor bone marrow response and even for myelofibrosis no current workup planned at this time.

## 2018-09-22 NOTE — ASSESSMENT & PLAN NOTE
09/21/2018.  Improvement the mental filtration rate with improvement of congestive heart failure.  Patient has long-term chronic kidney disease stage 3.  09/22/2018.  Patient continues to improve his glomerular filtration rate with improvement of congestive heart failure.  Continue current management.

## 2018-09-22 NOTE — PLAN OF CARE
Problem: Activity Intolerance (Adult)  Intervention: Promote Activity   09/22/18 0554   Activity   Activity Management ambulated* - L4   Daily Care Interventions   Self-Care Promotion independence encouraged

## 2018-09-22 NOTE — ASSESSMENT & PLAN NOTE
09/21/2018.  Cardiomyopathy with ejection fraction of 20%, improving slowly.  Lab values in a.m..  The wife is aware of the diagnosis and poor prognosis.  At present trying to place the patient in rehab in an attempt to get the patient back to baseline.  09/22/2018.  Patient continues to improve.  Will change IV Lasix to p.o. and DC potassium with potassium being 5.2.  Patient is on Aldactone.  Mag p.o. will be given.  The patient is improved from congestive heart failure.  The plan is to DC the patient to [facility] Monday to [place].  See above dictation on attempted barium swallow study.

## 2018-09-22 NOTE — PLAN OF CARE
Problem: Fall Risk (Adult)  Intervention: Reduce Risk/Promote Restraint Free Environment   18 0557   Safety Interventions   Environmental Safety Modification assistive device/personal items within reach      18 0557   Safety Interventions   Environmental Safety Modification assistive device/personal items within reach;clutter free environment maintained;lighting adjusted   Prevent  Drop/Fall   Safety/Security Measures bed alarm set

## 2018-09-22 NOTE — PLAN OF CARE
Problem: Patient Care Overview  Goal: Plan of Care Review  0721-  PT IS ON 2 LPM, PER NASAL CANNULA. PULSE OX IS 98% ON O2. HR 81.     PT STATED HE DOES NOT HAVE O2 AT HOME.  TOOK O2 OFF PATIENT AT THIS TIME, TO CHECK ROOM AIR SAT, TO WEAN O2.    0816-  PULSE OX IS 97-98% ON ROOM AIR. HR 76.  O2 REMAINS AT BEDSIDE FOR PRN USE, IF NEEDED.  INFORMED R.N. OF SATS, AND PT REMAINING ON ROOM AIR.

## 2018-09-23 PROCEDURE — 25000003 PHARM REV CODE 250: Performed by: INTERNAL MEDICINE

## 2018-09-23 PROCEDURE — 27000221 HC OXYGEN, UP TO 24 HOURS

## 2018-09-23 PROCEDURE — 63600175 PHARM REV CODE 636 W HCPCS: Performed by: INTERNAL MEDICINE

## 2018-09-23 PROCEDURE — 21400001 HC TELEMETRY ROOM

## 2018-09-23 PROCEDURE — 94760 N-INVAS EAR/PLS OXIMETRY 1: CPT

## 2018-09-23 RX ORDER — FUROSEMIDE 20 MG/1
20 TABLET ORAL DAILY
Status: DISCONTINUED | OUTPATIENT
Start: 2018-09-23 | End: 2018-09-24 | Stop reason: HOSPADM

## 2018-09-23 RX ORDER — ATENOLOL 25 MG/1
25 TABLET ORAL DAILY
Status: DISCONTINUED | OUTPATIENT
Start: 2018-09-23 | End: 2018-09-24 | Stop reason: HOSPADM

## 2018-09-23 RX ADMIN — TERAZOSIN HYDROCHLORIDE ANHYDROUS 5 MG: 1 CAPSULE ORAL at 09:09

## 2018-09-23 RX ADMIN — ATENOLOL 25 MG: 25 TABLET ORAL at 09:09

## 2018-09-23 RX ADMIN — FUROSEMIDE 20 MG: 20 TABLET ORAL at 09:09

## 2018-09-23 RX ADMIN — SPIRONOLACTONE 25 MG: 25 TABLET ORAL at 09:09

## 2018-09-23 RX ADMIN — ENOXAPARIN SODIUM 40 MG: 100 INJECTION SUBCUTANEOUS at 05:09

## 2018-09-23 NOTE — PLAN OF CARE
Problem: Patient Care Overview  Goal: Plan of Care Review  ATTEMPTED WEAN O2, PULSE OX DROPPED TO 88% ROOM AIR. R.N. REPLACED N/C 1 LPM, BACK ON PT.  PULSE OX IS 95% ON 1 LPM.

## 2018-09-23 NOTE — PLAN OF CARE
Problem: Fall Risk (Adult)  Goal: Absence of Falls  Patient will demonstrate the desired outcomes by discharge/transition of care.  Outcome: Ongoing (interventions implemented as appropriate)   09/23/18 0202   Fall Risk (Adult)   Absence of Falls making progress toward outcome

## 2018-09-23 NOTE — NURSING
CC WITH DR. LEUNG CONCERNING PTS BP READINGS BEING LOW/MD AWARE OF BP READINGS AND HAS ADJUSTED PTS MEDICATIONS ACCORDINGLY.

## 2018-09-23 NOTE — PLAN OF CARE
Problem: Fluid Volume Excess (Adult,Obstetrics,Pediatric)  Goal: Identify Related Risk Factors and Signs and Symptoms  Related risk factors and signs and symptoms are identified upon initiation of Human Response Clinical Practice Guideline (CPG)   09/23/18 0342   Fluid Volume Excess   Related Risk Factors (Fluid Volume Excess) disease process

## 2018-09-23 NOTE — ASSESSMENT & PLAN NOTE
09/21/2018.  Cardiomyopathy with ejection fraction of 20%, improving slowly.  Lab values in a.m..  The wife is aware of the diagnosis and poor prognosis.  At present trying to place the patient in rehab in an attempt to get the patient back to baseline.  09/22/2018.  Patient continues to improve.  Will change IV Lasix to p.o. and DC potassium with potassium being 5.2.  Patient is on Aldactone.  Mag p.o. will be given.  The patient is improved from congestive heart failure.  The plan is to DC the patient to [facility] Monday to [facility].  See above dictation on attempted barium swallow study.  09/23/2018.  Patient's urine output has been 500 cc of the last 24 hr he is on p.o. medications.  The patient's blood pressure has been low all adjust his medications further.  I expect the patient have a lower blood pressure he is not having difficulty with the blood pressure currently having in terms of lying in bed or sitting eating breakfast.  The patient ejection fraction is less than 20%.  The patient will have difficulty maintaining an adequate blood pressure.  Plans are DC the patient to skilled care in a.m..  Laboratory values in a.m..  Continue current level of care.

## 2018-09-23 NOTE — PROGRESS NOTES
[facility] Medicine  Progress Note    Patient Name: Kevin Schulte  MRN: [id number]  Patient Class: IP- Inpatient   Admission Date: 9/18/2018  Length of Stay: 3 days  Attending Physician: Cuauhtemoc Bush MD  Primary Care Provider: Cuauhtemoc Bush MD        Subjective:     Principal Problem:CHF (congestive heart failure), NYHA class IV, acute on chronic, combined    HPI:  Patient is 89-year-old male.  Dementia is present.  The patient fell approximately a week ago and presents emergency room at this time for workup for pain back right leg.  Noted on chest x-ray to have congestive heart failure.  The patient also complained of shortness of breath after questions.  Patient does not have a previous history of congestive heart failure.  He has been resistant workup in the past.  Patient has no peripheral edema.    Hospital Course:  Patient will be placed on CHF protocol Lasix potassium Coreg currently on lisinopril he will have a 2D echo performed.  I and O and weight  will be monitored.  09/19/2018.  Patient had an output of 1300 cc.  Continue diuresis today with Lasix twice and KCl twice.  Patient has atenolol and lisinopril.  CHF protocol.  The patient is no longer short of breath.  2D echo is pending today.  Patient desires to go home at some point will discuss with the wife with a not a or to go to rehab.  The patient has had multiple falls.  He is still cognitively intact. He does have mild-to-moderate dementia.  Lab in a.m..  Continue level of care. Patient agrees to rehab, will consult PT OT and order TB skin test.  09/20/2018.  Patient's BNP today is 1328.  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  Continue diuresis and monitoring of labs.  Will discuss was case management placement of the patient.  Will discuss with the wife findings on the 2D echo in prognosis.  Laboratory values reviewed.  Chest x-rays reviewed.  EKG is  reviewed.  Poor long-term prognosis with the patient.  Patient is a 89, will consider referral to Cardiology if possible for catheterization and pacemaker defibrillator if indicated.  This can be done as an outpatient.  09/21/2018.  The patient is up in a chair this morning he is still confused.  I had a long discussion with the wife yesterday we are planning on placing the patient in scale to try to getting up and going again.  Lab values are pending for in a.m..  Continue Lasix potassium Aldactone atenolol lisinopril.  Vital signs heart rate 110 the patient is afebrile blood pressure 120/65 will adjust medications as needed.  Case management social worker working on the case for placement in skilled.  09/22/2018.  The patient is up in a chair eating this morning he is able to swallow without any difficulty this morning.  Patient had a barium swallow yesterday which was nonproductive they were afraid he would aspirate during the procedure Dr. ortega radiologist suggested the patient have a complete speech evaluation.  Will decrease the patient's of Lasix to 40 p.o. a day DC KCL since his potassium is 5.2 the patient is also on Aldactone.  Will continue current level of care will check lab values Monday morning in anticipation at the patient is going to skilled.  Patient is less confused today and is improving.  However the patient is not completely orientated and judgment is not present.  09/23/2018.  Patient had 500 cc urine output last 24 hr.  Unable to weigh the patient.  Will ask and see if we can't weigh the patient in bed at some point.  Patient have laboratory values in a.m..  Plan is to discharge the patient to [facility] for rehab.  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  Blood pressure has been low which the patient has had in the past.  Will adjust medicines  to make sure that his blood pressure improved if possible.  Currently the patient is on low-dose meds.    Interval History:  No new complaints since last p.m..  The patient's blood pressure is low will adjust medications.  Patient did have output of 500 cc urine.  Weights have not been being done.  Will order weights again.  May be difficult to weigh the patient other than in the bed.  Awaiting placement in a[facility].  Have discussed with wife findings and placement rehab and the patient continues to deteriorate consideration for hospice may be needed to care for the patient home.  I have encouraged the wife about long-term care considering her age and inability to care for the patient.    Review of Systems   Constitutional: Negative for activity change, appetite change, chills, diaphoresis, fatigue, fever and unexpected weight change.   HENT: Negative for congestion, drooling, hearing loss and trouble swallowing.    Eyes: Negative for pain and visual disturbance.   Respiratory: Negative.  Negative for apnea, cough, choking, chest tightness, shortness of breath and wheezing.    Cardiovascular: Negative for chest pain, palpitations and leg swelling.   Gastrointestinal: Negative for abdominal distention, abdominal pain, blood in stool, constipation, diarrhea, nausea and vomiting.   Endocrine: Negative for polydipsia, polyphagia and polyuria.   Genitourinary: Negative for difficulty urinating, dysuria and flank pain.   Musculoskeletal: Negative for arthralgias, back pain, gait problem, joint swelling, neck pain and neck stiffness.   Skin: Negative for color change, rash and wound.   Allergic/Immunologic: Negative for environmental allergies, food allergies and immunocompromised state.   Neurological: Negative for dizziness, syncope, weakness, numbness and headaches.   Hematological: Negative for adenopathy.   Psychiatric/Behavioral: Negative for agitation, confusion and sleep disturbance. The patient is not  nervous/anxious.      Objective:     Vital Signs (Most Recent):  Temp: 96 °F (35.6 °C) (09/23/18 0334)  Pulse: 72 (09/23/18 0334)  Resp: 18 (09/23/18 0334)  BP: (!) 94/48 (09/23/18 0334)  SpO2: 100 % (09/23/18 0334) Vital Signs (24h Range):  Temp:  [96 °F (35.6 °C)-97 °F (36.1 °C)] 96 °F (35.6 °C)  Pulse:  [70-81] 72  Resp:  [14-18] 18  SpO2:  [94 %-100 %] 100 %  BP: (77-94)/(39-55) 94/48     Weight: 74.8 kg (164 lb 14.4 oz)  Body mass index is 26.62 kg/m².    Intake/Output Summary (Last 24 hours) at 9/23/2018 0653  Last data filed at 9/23/2018 0631  Gross per 24 hour   Intake 340 ml   Output 625 ml   Net -285 ml      Physical Exam   Constitutional: He appears well-developed and well-nourished.   HENT:   Head: Normocephalic and atraumatic.   Right Ear: External ear normal.   Left Ear: External ear normal.   Nose: Nose normal.   Eyes: Conjunctivae, EOM and lids are normal. Pupils are equal, round, and reactive to light.   Neck: Trachea normal, normal range of motion and full passive range of motion without pain. Neck supple.   Cardiovascular: Normal rate, regular rhythm, S1 normal, S2 normal, normal heart sounds and normal pulses.   Distant heart sounds.   Pulmonary/Chest: Effort normal. He has rales.   Decreased breath sounds at the bases few rales with which is diminished compared to previous.   Abdominal: Soft. Normal aorta and bowel sounds are normal.   Musculoskeletal: Normal range of motion.   Neurological: He is alert. He has normal reflexes.   The patient is alert will compensate with you but he sometimes does not answer the question that you are asking him he just makes statements and is confused.   Skin: Skin is warm, dry and intact. Capillary refill takes less than 2 seconds.   Psychiatric: He has a normal mood and affect. His speech is normal and behavior is normal. Cognition and memory are normal.   Nursing note and vitals reviewed.      Significant Labs: All pertinent labs within the past 24 hours have  been reviewed.    Significant Imaging: I have reviewed all pertinent imaging results/findings within the past 24 hours.    Assessment/Plan:      * CHF (congestive heart failure), NYHA class IV, acute on chronic, combined    09/21/2018.  Cardiomyopathy with ejection fraction of 20%, improving slowly.  Lab values in a.m..  The wife is aware of the diagnosis and poor prognosis.  At present trying to place the patient in rehab in an attempt to get the patient back to baseline.  09/22/2018.  Patient continues to improve.  Will change IV Lasix to p.o. and DC potassium with potassium being 5.2.  Patient is on Aldactone.  Mag p.o. will be given.  The patient is improved from congestive heart failure.  The plan is to DC the patient to [facility] Monday to [facility].  See above dictation on attempted barium swallow study.  09/23/2018.  Patient's urine output has been 500 cc of the last 24 hr he is on p.o. medications.  The patient's blood pressure has been low all adjust his medications further.  I expect the patient have a lower blood pressure he is not having difficulty with the blood pressure currently having in terms of lying in bed or sitting eating breakfast.  The patient ejection fraction is less than 20%.  The patient will have difficulty maintaining an adequate blood pressure.  Plans are DC the patient to skilled care in a.m..  Laboratory values in a.m..  Continue current level of care.        CKD (chronic kidney disease) stage 3, GFR 30-59 ml/min    09/21/2018.  Improvement the mental filtration rate with improvement of congestive heart failure.  Patient has long-term chronic kidney disease stage 3.  09/22/2018.  Patient continues to improve his glomerular filtration rate with improvement of congestive heart failure.  Continue current management.          Simple chronic anemia    Patient's H&H is stable.  Chronic simple anemia associated with chronic kidney disease and rectal stenosis which is cause the patient  some bleeding in the past.  The patient is age 89 consideration for poor bone marrow response and even for myelofibrosis no current workup planned at this time.            VTE Risk Mitigation (From admission, onward)        Ordered     enoxaparin injection 40 mg  Daily      09/18/18 1721     Place ALE hose  Until discontinued      09/18/18 1526              Cuauhtemoc Bush MD  Department of Hospital Medicine   [facility] S[facility]

## 2018-09-23 NOTE — NURSING
PT ASSISTED ONTO BSCC THEN INTO BED. PT POSITIONED UP IN BED FOR COMFORT. CALL LIGHT WITHIN REACH. NAD NOTED.

## 2018-09-23 NOTE — PLAN OF CARE
Problem: Patient Care Overview  Goal: Plan of Care Review   09/23/18 8262   Coping/Psychosocial   Plan Of Care Reviewed With patient

## 2018-09-23 NOTE — PLAN OF CARE
Problem: Cardiac: Heart Failure (Adult)  Intervention: Prevent/Manage DVT/VTE Risk   09/23/18 0341   OTHER   VTE Required Core Measure (TEDs) Compression stocking therapy initiated/maintained   Minimize Embolism Risk   VTE Prevention/Management remove, assess skin and reapply compression stockings

## 2018-09-23 NOTE — SUBJECTIVE & OBJECTIVE
Interval History:  No new complaints since last p.m..  The patient's blood pressure is low will adjust medications.  Patient did have output of 500 cc urine.  Weights have not been being done.  Will order weights again.  May be difficult to weigh the patient other than in the bed.  Awaiting placement in a[facility].  Have discussed with wife findings and placement rehab and the patient continues to deteriorate consideration for hospice may be needed to care for the patient home.  I have encouraged the wife about long-term care considering her age and inability to care for the patient.    Review of Systems   Constitutional: Negative for activity change, appetite change, chills, diaphoresis, fatigue, fever and unexpected weight change.   HENT: Negative for congestion, drooling, hearing loss and trouble swallowing.    Eyes: Negative for pain and visual disturbance.   Respiratory: Negative.  Negative for apnea, cough, choking, chest tightness, shortness of breath and wheezing.    Cardiovascular: Negative for chest pain, palpitations and leg swelling.   Gastrointestinal: Negative for abdominal distention, abdominal pain, blood in stool, constipation, diarrhea, nausea and vomiting.   Endocrine: Negative for polydipsia, polyphagia and polyuria.   Genitourinary: Negative for difficulty urinating, dysuria and flank pain.   Musculoskeletal: Negative for arthralgias, back pain, gait problem, joint swelling, neck pain and neck stiffness.   Skin: Negative for color change, rash and wound.   Allergic/Immunologic: Negative for environmental allergies, food allergies and immunocompromised state.   Neurological: Negative for dizziness, syncope, weakness, numbness and headaches.   Hematological: Negative for adenopathy.   Psychiatric/Behavioral: Negative for agitation, confusion and sleep disturbance. The patient is not nervous/anxious.      Objective:     Vital Signs (Most Recent):  Temp: 96 °F (35.6 °C) (09/23/18  0334)  Pulse: 72 (09/23/18 0334)  Resp: 18 (09/23/18 0334)  BP: (!) 94/48 (09/23/18 0334)  SpO2: 100 % (09/23/18 0334) Vital Signs (24h Range):  Temp:  [96 °F (35.6 °C)-97 °F (36.1 °C)] 96 °F (35.6 °C)  Pulse:  [70-81] 72  Resp:  [14-18] 18  SpO2:  [94 %-100 %] 100 %  BP: (77-94)/(39-55) 94/48     Weight: 74.8 kg (164 lb 14.4 oz)  Body mass index is 26.62 kg/m².    Intake/Output Summary (Last 24 hours) at 9/23/2018 0653  Last data filed at 9/23/2018 0631  Gross per 24 hour   Intake 340 ml   Output 625 ml   Net -285 ml      Physical Exam   Constitutional: He appears well-developed and well-nourished.   HENT:   Head: Normocephalic and atraumatic.   Right Ear: External ear normal.   Left Ear: External ear normal.   Nose: Nose normal.   Eyes: Conjunctivae, EOM and lids are normal. Pupils are equal, round, and reactive to light.   Neck: Trachea normal, normal range of motion and full passive range of motion without pain. Neck supple.   Cardiovascular: Normal rate, regular rhythm, S1 normal, S2 normal, normal heart sounds and normal pulses.   Distant heart sounds.   Pulmonary/Chest: Effort normal. He has rales.   Decreased breath sounds at the bases few rales with which is diminished compared to previous.   Abdominal: Soft. Normal aorta and bowel sounds are normal.   Musculoskeletal: Normal range of motion.   Neurological: He is alert. He has normal reflexes.   The patient is alert will compensate with you but he sometimes does not answer the question that you are asking him he just makes statements and is confused.   Skin: Skin is warm, dry and intact. Capillary refill takes less than 2 seconds.   Psychiatric: He has a normal mood and affect. His speech is normal and behavior is normal. Cognition and memory are normal.   Nursing note and vitals reviewed.      Significant Labs: All pertinent labs within the past 24 hours have been reviewed.    Significant Imaging: I have reviewed all pertinent imaging results/findings  within the past 24 hours.

## 2018-09-24 VITALS
SYSTOLIC BLOOD PRESSURE: 145 MMHG | HEART RATE: 66 BPM | TEMPERATURE: 97 F | HEIGHT: 66 IN | WEIGHT: 164.88 LBS | BODY MASS INDEX: 26.5 KG/M2 | RESPIRATION RATE: 18 BRPM | OXYGEN SATURATION: 98 % | DIASTOLIC BLOOD PRESSURE: 82 MMHG

## 2018-09-24 LAB
ANION GAP SERPL CALC-SCNC: 11 MMOL/L
BNP SERPL-MCNC: 1867 PG/ML
BUN SERPL-MCNC: 76 MG/DL
CALCIUM SERPL-MCNC: 8.4 MG/DL
CHLORIDE SERPL-SCNC: 98 MMOL/L
CO2 SERPL-SCNC: 28 MMOL/L
CREAT SERPL-MCNC: 2.1 MG/DL
EST. GFR  (AFRICAN AMERICAN): 30.7 ML/MIN/1.73 M^2
EST. GFR  (NON AFRICAN AMERICAN): 26.5 ML/MIN/1.73 M^2
GLUCOSE SERPL-MCNC: 125 MG/DL
POTASSIUM SERPL-SCNC: 4.7 MMOL/L
SODIUM SERPL-SCNC: 137 MMOL/L

## 2018-09-24 PROCEDURE — 90670 PCV13 VACCINE IM: CPT | Performed by: INTERNAL MEDICINE

## 2018-09-24 PROCEDURE — 3E0234Z INTRODUCTION OF SERUM, TOXOID AND VACCINE INTO MUSCLE, PERCUTANEOUS APPROACH: ICD-10-PCS | Performed by: INTERNAL MEDICINE

## 2018-09-24 PROCEDURE — G0009 ADMIN PNEUMOCOCCAL VACCINE: HCPCS | Performed by: INTERNAL MEDICINE

## 2018-09-24 PROCEDURE — 83880 ASSAY OF NATRIURETIC PEPTIDE: CPT

## 2018-09-24 PROCEDURE — G0008 ADMIN INFLUENZA VIRUS VAC: HCPCS | Performed by: INTERNAL MEDICINE

## 2018-09-24 PROCEDURE — 90472 IMMUNIZATION ADMIN EACH ADD: CPT | Performed by: INTERNAL MEDICINE

## 2018-09-24 PROCEDURE — 94761 N-INVAS EAR/PLS OXIMETRY MLT: CPT

## 2018-09-24 PROCEDURE — 36415 COLL VENOUS BLD VENIPUNCTURE: CPT

## 2018-09-24 PROCEDURE — 25000003 PHARM REV CODE 250: Performed by: INTERNAL MEDICINE

## 2018-09-24 PROCEDURE — 63600175 PHARM REV CODE 636 W HCPCS: Performed by: INTERNAL MEDICINE

## 2018-09-24 PROCEDURE — 80048 BASIC METABOLIC PNL TOTAL CA: CPT

## 2018-09-24 PROCEDURE — 90471 IMMUNIZATION ADMIN: CPT | Performed by: INTERNAL MEDICINE

## 2018-09-24 PROCEDURE — 27000221 HC OXYGEN, UP TO 24 HOURS

## 2018-09-24 PROCEDURE — 90662 IIV NO PRSV INCREASED AG IM: CPT | Performed by: INTERNAL MEDICINE

## 2018-09-24 RX ORDER — FUROSEMIDE 20 MG/1
20 TABLET ORAL DAILY
Qty: 30 TABLET | Refills: 11 | Status: SHIPPED | OUTPATIENT
Start: 2018-09-24 | End: 2019-09-24

## 2018-09-24 RX ORDER — SPIRONOLACTONE 25 MG/1
25 TABLET ORAL DAILY
Qty: 30 TABLET | Refills: 11 | Status: SHIPPED | OUTPATIENT
Start: 2018-09-24 | End: 2019-09-24

## 2018-09-24 RX ADMIN — SPIRONOLACTONE 25 MG: 25 TABLET ORAL at 08:09

## 2018-09-24 RX ADMIN — TERAZOSIN HYDROCHLORIDE ANHYDROUS 5 MG: 1 CAPSULE ORAL at 08:09

## 2018-09-24 RX ADMIN — INFLUENZA A VIRUS A/MICHIGAN/45/2015 X-275 (H1N1) ANTIGEN (FORMALDEHYDE INACTIVATED), INFLUENZA A VIRUS A/SINGAPORE/INFIMH-16-0019/2016 IVR-186 (H3N2) ANTIGEN (FORMALDEHYDE INACTIVATED), AND INFLUENZA B VIRUS B/MARYLAND/15/2016 BX-69A (A B/COLORADO/6/2017-LIKE VIRUS) ANTIGEN (FORMALDEHYDE INACTIVATED) 0.5 ML: 60; 60; 60 INJECTION, SUSPENSION INTRAMUSCULAR at 01:09

## 2018-09-24 RX ADMIN — FUROSEMIDE 20 MG: 20 TABLET ORAL at 08:09

## 2018-09-24 RX ADMIN — PNEUMOCOCCAL 13-VALENT CONJUGATE VACCINE 0.5 ML: 2.2; 2.2; 2.2; 2.2; 2.2; 4.4; 2.2; 2.2; 2.2; 2.2; 2.2; 2.2; 2.2 INJECTION, SUSPENSION INTRAMUSCULAR at 01:09

## 2018-09-24 NOTE — PLAN OF CARE
09/24/18 1324   Medicare Message   Important Message from Medicare regarding Discharge Appeal Rights Given to patient/caregiver;Explained to patient/caregiver;Signed/date by patient/caregiver   Date IMM was signed 09/24/18   Time IMM was signed 0900

## 2018-09-24 NOTE — PLAN OF CARE
Problem: Patient Care Overview  Goal: Plan of Care Review  Outcome: Ongoing (interventions implemented as appropriate)   09/24/18 8902   Coping/Psychosocial   Plan Of Care Reviewed With patient;spouse

## 2018-09-24 NOTE — NURSING
PT LEFT AT THIS TIME IN WHEELCHAIR, RESP EVEN AND UNLABORED. NAD NOTED. WIFE AT SIDE. DISCHARGE FOLDER GIVEN TO . INFORMED TO NOTIFY NURSING STAFF AT [facility] TO CALL FOR ANY INFORMATION NEEDED. NO FURTHER NEEDS NOTED.EVERTON,RN

## 2018-09-24 NOTE — PLAN OF CARE
Problem: Breathing Pattern Ineffective (Adult)  Intervention: Optimize Oxygenation/Ventilation/Perfusion   09/24/18 0105   Positioning   Head of Bed (HOB) HOB at 45 degrees   Respiratory Interventions   Airway/Ventilation Management calming measures promoted     Intervention: Minimize Oxygen Consumption/Demand   09/24/18 0105   Coping/Psychosocial Interventions   Environmental Support calm environment promoted   Activity   Activity Management ambulated in room* - L4;activity clustered for rest period*       Goal: Identify Related Risk Factors and Signs and Symptoms  Related risk factors and signs and symptoms are identified upon initiation of Human Response Clinical Practice Guideline (CPG)  Outcome: Ongoing (interventions implemented as appropriate)   09/24/18 0105   Breathing Pattern Ineffective   Related Risk Factors (Breathing Pattern Ineffective) advanced age;fatigue     Goal: Effective Oxygenation/Ventilation  Patient will demonstrate the desired outcomes by discharge/transition of care.  Outcome: Ongoing (interventions implemented as appropriate)   09/24/18 0105   Breathing Pattern Ineffective (Adult)   Effective Oxygenation/Ventilation making progress toward outcome     Goal: Anxiety/Fear Reduction  Patient will demonstrate the desired outcomes by discharge/transition of care.  Outcome: Ongoing (interventions implemented as appropriate)   09/24/18 0105   Breathing Pattern Ineffective (Adult)   Anxiety/Fear Reduction making progress toward outcome

## 2018-09-24 NOTE — PLAN OF CARE
[facility]    FACILITY TRANSFER ORDERS      Patient Name: Kevin Schulte  YOB: 1926    PCP: Cuauhtemoc Bush MD   PCP Address: [place] [address] / [facility] [address]  PCP Phone Number: [phone]  PCP Fax: [phone]    Encounter Date: 09/24/2018    [facility]    Vital Signs:  Routine    Diagnoses:   Active Hospital Problems    Diagnosis  POA    *CHF (congestive heart failure), NYHA class IV, acute on chronic, combined [I50.43]  Yes     89-year-old white male who has never had congestive heart failure before.  I have taking care time for very long time.  Recently seen in the office no complaints of heart failure at that time.  In the past he has not been willing to have various tests done.  He presented to the emergency room at this time after fall last week being worked up for pain back right leg.  X-rays appear to be normal.  However chest x-ray show congestive heart failure BNP was 2059.  Patient admitted for treatment of congestive heart failure.  CHF protocol be used on the patient.  Patient is currently on lisinopril Lasix will be added potassium be added and the patient will have cold radiated.  2D echo will be performed.      Simple chronic anemia [D53.9]  Yes     Simple chronic anemia associated with rectal stenosis chronic kidney disease nutritional status age.      CKD (chronic kidney disease) stage 3, GFR 30-59 ml/min [N18.3]  Yes     Chronic kidney disease stage 3 associated with hypertension.  Longstanding marrow filtration rate less than 60.  Current medical filtration rate of 43.  Expect this to improve slightly with improvement congestive heart failure and renal blood flow.      Syncope [R55]  Yes     Chronic falls associated with debility, spinal stenosis, poor nutrition, and lack of use of walker.        Resolved Hospital Problems   No resolved problems to display.       Allergies:Review of patient's allergies indicates:  No Known  Allergies    Diet: cardiac diet and 2 gram sodium diet    Activities: Activity as tolerated    Nursing:  Monitor patient's vital signs and weight.  Patient has congestive heart failure will need increase Lasix to 40 twice a day lab in 1 week.  Twelve and half of 10 wall q.a.m..  Monitor blood pressure closely hold medications if blood pressure less than 90/60.      Labs: BMP and BNP One-week     CONSULTS:    Physical Therapy to evaluate and treat. , Occupational Therapy to evaluate and treat., Speech Therapy to evaluate and treat for Swallowing. and  to evaluate for community resources/long-range planning.    MISCELLANEOUS CARE:  None    WOUND CARE ORDERS  None    Medications: Review discharge medications with patient and family and provide education.      Current Discharge Medication List      START taking these medications    Details   furosemide (LASIX) 20 MG tablet Take 1 tablet (20 mg total) by mouth once daily.  Qty: 30 tablet, Refills: 11      spironolactone (ALDACTONE) 25 MG tablet Take 1 tablet (25 mg total) by mouth once daily.  Qty: 30 tablet, Refills: 11         CONTINUE these medications which have NOT CHANGED    Details   terazosin (HYTRIN) 10 MG capsule Take 1 capsule by mouth once daily.      traMADol (ULTRAM) 50 mg tablet Take 50 mg by mouth every 6 (six) hours as needed for Pain.         STOP taking these medications       atenolol (TENORMIN) 25 MG tablet Comments:   Reason for Stopping:         lisinopril 10 MG tablet Comments:   Reason for Stopping:         oxyCODONE-acetaminophen (PERCOCET) 5-325 mg per tablet Comments:   Reason for Stopping:                    _________________________________  Cuauhtemoc Bush MD  09/24/2018

## 2018-09-24 NOTE — DISCHARGE SUMMARY
[facility]  Hospital Medicine  Discharge Summary      Patient Name: Kevin Schulte  MRN: [id number]  Admission Date: 9/18/2018  Hospital Length of Stay: 4 days  Discharge Date and Time:  09/24/2018 6:49 AM  Attending Physician: Cuauhtemoc Bush MD   Discharging Provider: Cuauhtemoc Bush MD  Primary Care Provider: Cuauhtemoc Bush MD      HPI:   Patient is 89-year-old male.  Dementia is present.  The patient fell approximately a week ago and presents emergency room at this time for workup for pain back right leg.  Noted on chest x-ray to have congestive heart failure.  The patient also complained of shortness of breath after questions.  Patient does not have a previous history of congestive heart failure.  He has been resistant workup in the past.  Patient has no peripheral edema.    * No surgery found *      Hospital Course:   Patient will be placed on CHF protocol Lasix potassium Coreg currently on lisinopril he will have a 2D echo performed.  I and O and weight  will be monitored.  09/19/2018.  Patient had an output of 1300 cc.  Continue diuresis today with Lasix twice and KCl twice.  Patient has atenolol and lisinopril.  CHF protocol.  The patient is no longer short of breath.  2D echo is pending today.  Patient desires to go home at some point will discuss with the wife with a not a or to go to rehab.  The patient has had multiple falls.  He is still cognitively intact. He does have mild-to-moderate dementia.  Lab in a.m..  Continue level of care. Patient agrees to rehab, will consult PT OT and order TB skin test.  09/20/2018.  Patient's BNP today is 1328.  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  Continue diuresis and monitoring of labs.  Will discuss was case management placement of the patient.  Will discuss with the wife findings on the 2D echo in prognosis.  Laboratory values reviewed.  Chest x-rays reviewed.  EKG is  reviewed.  Poor long-term prognosis with the patient.  Patient is a 89, will consider referral to Cardiology if possible for catheterization and pacemaker defibrillator if indicated.  This can be done as an outpatient.  09/21/2018.  The patient is up in a chair this morning he is still confused.  I had a long discussion with the wife yesterday we are planning on placing the patient in scale to try to getting up and going again.  Lab values are pending for in a.m..  Continue Lasix potassium Aldactone atenolol lisinopril.  Vital signs heart rate 110 the patient is afebrile blood pressure 120/65 will adjust medications as needed.  Case management social worker working on the case for placement in skilled.  09/22/2018.  The patient is up in a chair eating this morning he is able to swallow without any difficulty this morning.  Patient had a barium swallow yesterday which was nonproductive they were afraid he would aspirate during the procedure Dr. ortega radiologist suggested the patient have a complete speech evaluation.  Will decrease the patient's of Lasix to 40 p.o. a day DC KCL since his potassium is 5.2 the patient is also on Aldactone.  Will continue current level of care will check lab values Monday morning in anticipation at the patient is going to skilled.  Patient is less confused today and is improving.  However the patient is not completely orientated and judgment is not present.  09/23/2018.  Patient had 500 cc urine output last 24 hr.  Unable to weigh the patient.  Will ask and see if we can't weigh the patient in bed at some point.  Patient have laboratory values in a.m..  Plan is to discharge the patient to [facility] for rehab.  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  Blood pressure has been low which the patient has had in the past.  Will adjust medicines  to make sure that his blood pressure improved if possible.  Currently the patient is on low-dose meds.  09/24/2018.  Patient will be discharged [facility] I will be the patient's attending at at .  I will adjust the patient's medications there.  He will need increasing doses of Lasix.  Continues Aldactone continue his atenolol Ultram for pain. Lisinopril will be stopped due to hypotension.  Patient will be monitored closely.     Consults:   Consults (From admission, onward)        Status Ordering Provider     Inpatient consult to Case Management  Once     Provider:  (Not yet assigned)    Acknowledged AVLERIE LEUNG          Syncope    Syncope is of concern in this patient due to his blood pressure.  His inability to ambulate debilitated state and mental status.  Blood pressure medications have been altered.  Lisinopril has been stopped Aldactone has been added.  Lasix 40 mg be needed at the nursing home.  Atenolol 25 mg control heart rate.  Patient's 2D echo shows ejection fraction less than 20%.            Final Active Diagnoses:    Diagnosis Date Noted POA    PRINCIPAL PROBLEM:  CHF (congestive heart failure), NYHA class IV, acute on chronic, combined [I50.43] 09/18/2018 Yes    Simple chronic anemia [D53.9] 09/19/2018 Yes    CKD (chronic kidney disease) stage 3, GFR 30-59 ml/min [N18.3] 09/19/2018 Yes    Syncope [R55] 09/19/2018 Yes      Problems Resolved During this Admission:       Discharged Condition: fair    Disposition: Skilled Nursing Facility    Follow Up:    Patient Instructions:   No discharge procedures on file.    Significant Diagnostic Studies: Labs:   BMP:   Recent Labs   Lab  09/22/18   0719  09/24/18   0449   GLU  117*  125*   NA  136  137   K  5.2*  4.7   CL  98  98   CO2  28  28   BUN  47*  76*   CREATININE  1.6*  2.1*   CALCIUM  8.3*  8.4*   , CMP   Recent Labs   Lab  09/22/18   0719  09/24/18   0449   NA  136  137   K  5.2*  4.7   CL  98  98   CO2  28  28   GLU  117*  125*   BUN   47*  76*   CREATININE  1.6*  2.1*   CALCIUM  8.3*  8.4*   ANIONGAP  10  11   ESTGFRAFRICA  42.6*  30.7*   EGFRNONAA  36.9*  26.5*   , CBC   Recent Labs   Lab  09/22/18   0719   WBC  11.15   HGB  10.1*   HCT  31.0*   PLT  293    and All labs within the past 24 hours have been reviewed    Pending Diagnostic Studies:     None         Medications:  Reconciled Home Medications:      Medication List      START taking these medications    furosemide 20 MG tablet  Commonly known as:  LASIX  Take 1 tablet (20 mg total) by mouth once daily.     spironolactone 25 MG tablet  Commonly known as:  ALDACTONE  Take 1 tablet (25 mg total) by mouth once daily.        CONTINUE taking these medications    terazosin 10 MG capsule  Commonly known as:  HYTRIN  Take 1 capsule by mouth once daily.     traMADol 50 mg tablet  Commonly known as:  ULTRAM  Take 50 mg by mouth every 6 (six) hours as needed for Pain.        STOP taking these medications    atenolol 25 MG tablet  Commonly known as:  TENORMIN     lisinopril 10 MG tablet     oxyCODONE-acetaminophen 5-325 mg per tablet  Commonly known as:  PERCOCET            Indwelling Lines/Drains at time of discharge:   Lines/Drains/Airways          None          Time spent on the discharge of patient: 35 minutes  Patient was seen and examined on the date of discharge and determined to be suitable for discharge.         Cuauhtemoc Bush MD  Department of Hospital Medicine  [facility]

## 2018-09-24 NOTE — PLAN OF CARE
09/24/18 1320   Final Note   Assessment Type Final Discharge Note   Discharge Disposition SNF   What phone number can be called within the next 1-3 days to see how you are doing after discharge? [phone]   Hospital Follow Up  Appt(s) scheduled? Yes   Discharge plans and expectations educations in teach back method with documentation complete? Yes   All paperwork sent to [facility] & per  patient is ready to go. [place] will come  patient. No other discharge needs at this time.

## 2018-09-24 NOTE — ASSESSMENT & PLAN NOTE
Syncope is of concern in this patient due to his blood pressure.  His inability to ambulate debilitated state and mental status.  Blood pressure medications have been altered.  Lisinopril has been stopped Aldactone has been added.  Lasix 40 mg be needed at the nursing home.  Atenolol 25 mg control heart rate.  Patient's 2D echo shows ejection fraction less than 20%.

## 2018-09-24 NOTE — PLAN OF CARE
Problem: Fall Risk (Adult)  Goal: Absence of Falls  Patient will demonstrate the desired outcomes by discharge/transition of care.  Outcome: Ongoing (interventions implemented as appropriate)   09/24/18 1053   Fall Risk (Adult)   Absence of Falls making progress toward outcome

## 2018-09-24 NOTE — NURSING
[id number]-SPOKE WITH  AWAITING PAPERWORK. [place],RN   6215-SPOKE WITH  CONT TO WAIT ON MD AND PAPERWORK EVERTON,RN

## 2018-09-24 NOTE — PLAN OF CARE
09/24/18 1217   Discharge Reassessment   Assessment Type Discharge Planning Reassessment   Discharge plan remains the same: Yes   Provided patient/caregiver education on the expected discharge date and the discharge plan Yes   Discharge Plan A Skilled Nursing Facility   Change in patient condition or support system No   Wife in room. Informed waiting for all the paperwork to be done before he will go to [facility]. Denies any new discharge needs at this time.

## 2018-09-26 NOTE — PHYSICIAN QUERY
"PT Name: Kevin cShulte  MR #: [id number]    Physician Query Form - Heart  Condition Clarification     CDS/: Merna Levin RN, CDI              Contact information: [phone]  This form is a permanent document in the medical record.     Query Date: September 26, 2018    By submitting this query, we are merely seeking further clarification of documentation. Please utilize your independent clinical judgment when addressing the question(s) below.    The medical record contains the following   Indicators     Supporting Clinical Findings Location in Medical Record   x BNP BNP = 2,054-> 1361-> 1,615-> 1,867   Labs 9/18, 9/20, 9/22,9/24   x EF Patient's 2D echo shows ejection fraction less than 20%.    Discharge Summary 9/24   x Radiology findings Impression      1. Findings consistent with congestive heart failure.  2. Suspected small bilateral pleural effusions.  3. Bone demineralization.  Close interval follow-up is recommended   Chest Xray 9/18   x Echo Results Conclusion  · Left ventricle shows concentric hypertrophy.  · Moderately elevated central venous pressure (8 mm Hg).  · The estimated PA systolic pressure is 65.15 mm Hg  · Left ventricle ejection fraction is decreased at 20%  · Grade III (severe) left ventricular diastolic dysfunction consistent with restrictive physiology.  · LA pressure is elevated.  · RV systolic function is normal.  · There is no stenosis of the Mitral Valve.  · Mitral valve shows trace regurgitation.  · There is no significant stenosis of the Tricuspid Valve.  · Tricuspid valve shows trace regurgitation.  · There is no stenosis of the Pulmonic Valve.     Transthoracic  ECHO 9/19    "Ascites" documented     x "SOB" or "BLANCO" documented The patient also complained of shortness of breath after questions     H&P 9/18    "Hypoxia" documented     x Heart Failure documented Acute on chronic congestive heart failure       CHF (congestive heart failure), NYHA class IV, acute on chronic, " "combined  H&P 9/18      Progress Note 9/21       Hospital Medicine     "Edema" documented     x Diuretics/Meds   Furosemide IV  MAR 9/18- 9/22      Treatment:     x Other:  Patient does not have a previous history of congestive heart failure.   H&P 9/18     Heart failure (HF) can be acute, chronic or both. It is generally further specificed as systolic, diastolic, or combined. Lastly, it is important to identify an underlying etiology if known or suspected.     Common clues to acute exacerbation:  Rapidly progressive symptoms (w/in 2 weeks of presentation), using IV diuretics to treat, using supplemental O2, pulmonary edema on Xray, MI w/in 4 weeks, and/or BNP >500    Systolic Heart Failure: is defined as chart documentation of a left ventricular ejection fraction (LVEF) less than 40%     Diastolic Heart Failure: is defined as a left ventricular ejection fraction (LVEF) greater than 40%   +      Evidence of diastolic dysfunction on echocardiography OR    Right heart catheterization wedge pressure above 12 mm Hg OR    Left heart catheterization left ventricular end diastolic pressure 18 mm Hg or above.    References: *American Heart Association    The clinical guidelines noted below are only system guidelines, and do not replace the providers clinical judgment.     Provider, please specify the diagnosis associated with above clinical findings    Please clarify the acuity and type of CHF.    [  x ] Acute Combined Systolic and Diastolic Heart Failure     [   ] Acute on Chronic Combined Systolic and Diastolic Heart Failure                     [   ] Other Type of Heart Failure (please specify type): _________________________    [   ] Other (please specify): ___________________________________    [   ] Clinically Undetermined                          Please document in your progress notes daily for the duration of treatment until resolved and include in your discharge summary.  "

## 2018-12-29 ENCOUNTER — HOSPITAL ENCOUNTER (EMERGENCY)
Facility: HOSPITAL | Age: 83
Discharge: HOME | End: 2018-12-30
Attending: FAMILY MEDICINE
Payer: MEDICARE

## 2018-12-29 DIAGNOSIS — I50.9 ACUTE ON CHRONIC CONGESTIVE HEART FAILURE, UNSPECIFIED HEART FAILURE TYPE: Primary | ICD-10-CM

## 2018-12-29 DIAGNOSIS — R06.02 SHORTNESS OF BREATH: ICD-10-CM

## 2018-12-29 LAB
ALBUMIN SERPL BCP-MCNC: 3.2 G/DL
ALP SERPL-CCNC: 51 U/L
ALT SERPL W/O P-5'-P-CCNC: 14 U/L
ANION GAP SERPL CALC-SCNC: 17 MMOL/L
AST SERPL-CCNC: 28 U/L
BASOPHILS # BLD AUTO: 0.02 K/UL
BASOPHILS NFR BLD: 0.1 %
BILIRUB SERPL-MCNC: 0.5 MG/DL
BNP SERPL-MCNC: 1116 PG/ML
BUN SERPL-MCNC: 31 MG/DL
CALCIUM SERPL-MCNC: 8.7 MG/DL
CHLORIDE SERPL-SCNC: 98 MMOL/L
CO2 SERPL-SCNC: 21 MMOL/L
CREAT SERPL-MCNC: 1.5 MG/DL
DIFFERENTIAL METHOD: ABNORMAL
EOSINOPHIL # BLD AUTO: 0 K/UL
EOSINOPHIL NFR BLD: 0.1 %
ERYTHROCYTE [DISTWIDTH] IN BLOOD BY AUTOMATED COUNT: 14.3 %
EST. GFR  (AFRICAN AMERICAN): 46.1 ML/MIN/1.73 M^2
EST. GFR  (NON AFRICAN AMERICAN): 39.8 ML/MIN/1.73 M^2
GLUCOSE SERPL-MCNC: 197 MG/DL
HCT VFR BLD AUTO: 28.8 %
HGB BLD-MCNC: 9.4 G/DL
IMM GRANULOCYTES # BLD AUTO: 0.07 K/UL
IMM GRANULOCYTES NFR BLD AUTO: 0.5 %
LYMPHOCYTES # BLD AUTO: 0.4 K/UL
LYMPHOCYTES NFR BLD: 2.4 %
MCH RBC QN AUTO: 29 PG
MCHC RBC AUTO-ENTMCNC: 32.6 G/DL
MCV RBC AUTO: 89 FL
MONOCYTES # BLD AUTO: 1.2 K/UL
MONOCYTES NFR BLD: 7.8 %
NEUTROPHILS # BLD AUTO: 13.2 K/UL
NEUTROPHILS NFR BLD: 89.1 %
NRBC BLD-RTO: 0 /100 WBC
PLATELET # BLD AUTO: 229 K/UL
PMV BLD AUTO: 10.4 FL
POTASSIUM SERPL-SCNC: 4.5 MMOL/L
PROT SERPL-MCNC: 7.3 G/DL
RBC # BLD AUTO: 3.24 M/UL
SODIUM SERPL-SCNC: 136 MMOL/L
WBC # BLD AUTO: 14.76 K/UL

## 2018-12-29 PROCEDURE — 93005 ELECTROCARDIOGRAM TRACING: CPT

## 2018-12-29 PROCEDURE — 63600175 PHARM REV CODE 636 W HCPCS: Performed by: FAMILY MEDICINE

## 2018-12-29 PROCEDURE — 85025 COMPLETE CBC W/AUTO DIFF WBC: CPT

## 2018-12-29 PROCEDURE — 80053 COMPREHEN METABOLIC PANEL: CPT

## 2018-12-29 PROCEDURE — 83880 ASSAY OF NATRIURETIC PEPTIDE: CPT

## 2018-12-29 PROCEDURE — 71045 XR CHEST AP PORTABLE: ICD-10-PCS | Mod: 26,GW,, | Performed by: RADIOLOGY

## 2018-12-29 PROCEDURE — 99285 EMERGENCY DEPT VISIT HI MDM: CPT | Mod: 25

## 2018-12-29 PROCEDURE — 96375 TX/PRO/DX INJ NEW DRUG ADDON: CPT

## 2018-12-29 PROCEDURE — 71045 X-RAY EXAM CHEST 1 VIEW: CPT | Mod: TC,FY

## 2018-12-29 PROCEDURE — 96374 THER/PROPH/DIAG INJ IV PUSH: CPT

## 2018-12-29 PROCEDURE — 71045 X-RAY EXAM CHEST 1 VIEW: CPT | Mod: 26,GW,, | Performed by: RADIOLOGY

## 2018-12-29 RX ORDER — FUROSEMIDE 10 MG/ML
80 INJECTION INTRAMUSCULAR; INTRAVENOUS
Status: COMPLETED | OUTPATIENT
Start: 2018-12-29 | End: 2018-12-29

## 2018-12-29 RX ORDER — FUROSEMIDE 10 MG/ML
40 INJECTION INTRAMUSCULAR; INTRAVENOUS
Status: DISCONTINUED | OUTPATIENT
Start: 2018-12-29 | End: 2018-12-30 | Stop reason: HOSPADM

## 2018-12-29 RX ADMIN — FUROSEMIDE 80 MG: 10 INJECTION, SOLUTION INTRAVENOUS at 11:12

## 2018-12-30 VITALS
SYSTOLIC BLOOD PRESSURE: 101 MMHG | RESPIRATION RATE: 15 BRPM | BODY MASS INDEX: 23.32 KG/M2 | HEIGHT: 65 IN | DIASTOLIC BLOOD PRESSURE: 62 MMHG | HEART RATE: 83 BPM | WEIGHT: 140 LBS | OXYGEN SATURATION: 99 %

## 2018-12-30 PROCEDURE — 63600175 PHARM REV CODE 636 W HCPCS: Performed by: FAMILY MEDICINE

## 2018-12-30 RX ORDER — LORAZEPAM 2 MG/ML
0.5 INJECTION INTRAMUSCULAR
Status: COMPLETED | OUTPATIENT
Start: 2018-12-30 | End: 2018-12-30

## 2018-12-30 RX ADMIN — LORAZEPAM 0.5 MG: 2 INJECTION INTRAMUSCULAR; INTRAVENOUS at 03:12

## 2018-12-30 NOTE — ED NOTES
Report received from SOPHIE Whitney. Who states [facility] is to call our facility with price for pt's transport home.  Pt is asleep in ER bed with side rails up for safety. Respirations equal and unlabored. Will continue to monitor.

## 2018-12-30 NOTE — ED NOTES
Pt's wife at the bedside. Plan for transport home discussed. Pt remains asleep, with respirations equal and unlabored. Will continue to monitor.

## 2018-12-30 NOTE — ED NOTES
[facility] called for transport of pt home. Spoke with Edvin who stated she would be sending transport for the pt.

## 2018-12-30 NOTE — DISCHARGE INSTRUCTIONS
Return to the ER if condition changes or worsens.  Follow up with hospice nurse for further orders from Dr. Bush.

## 2018-12-30 NOTE — ED NOTES
Pt repositioned in bed. Pt is aware we are awaiting transport set up. Pt denies wanting any food at this time. Will continue to monitor.

## 2018-12-31 NOTE — ED PROVIDER NOTES
Encounter Date: 2018       History     Chief Complaint   Patient presents with    Shortness of Breath    Altered Mental Status     To ED per stretcher with accompanying hospice nurse who reports decreased LOC and low oxygen satruation.            Review of patient's allergies indicates:  No Known Allergies  Past Medical History:   Diagnosis Date    CHF (congestive heart failure)     CKD (chronic kidney disease) stage 3, GFR 30-59 ml/min     Dementia     Hypertension     Syncope      Past Surgical History:   Procedure Laterality Date    BIOPSY, ANORECTAL WALL N/A 2018    Performed by [facility] at [facility] OR    DILATION, RECTUM N/A 2018    Performed by [facility] at [facility] OR    Exam under anesthesia N/A 2018    Performed by [facility] at [facility] OR    hemorrhoidectopm       No family history on file.  Social History     Tobacco Use    Smoking status: Former Smoker     Packs/day: 0.25     Types: Cigarettes     Last attempt to quit: 1988     Years since quittin.4    Smokeless tobacco: Never Used   Substance Use Topics    Alcohol use: No     Frequency: Never    Drug use: No     Review of Systems   HENT: Positive for congestion.    Eyes: Negative.    Respiratory: Positive for shortness of breath and wheezing.    Cardiovascular: Positive for leg swelling.        1+ edema to BLE   Gastrointestinal: Negative.    Endocrine: Negative.    Genitourinary: Negative.    Musculoskeletal: Positive for gait problem.        Weakness due to chronic disease process.     Skin: Negative.    Neurological: Positive for tremors and weakness.   Hematological: Negative.    Psychiatric/Behavioral: Positive for confusion.        Poorly interactive on presentation.        Physical Exam     Initial Vitals [183]   BP Pulse Resp Temp SpO2   107/67 (!) 113 (!) 30 -- (!) 87 %      MAP       --         Physical Exam    Constitutional: He appears ill.   HENT:   Head:  Normocephalic and atraumatic.   Mouth/Throat: Oropharynx is clear and moist.   Eyes: EOM are normal. Pupils are equal, round, and reactive to light.   Neck: Normal range of motion. Neck supple. No thyromegaly present. No tracheal deviation present. No hepatojugular reflux and no JVD present.   Cardiovascular: Regular rhythm and normal heart sounds.   Pulmonary/Chest: Tachypnea noted. He has rales in the right lower field and the left lower field. He exhibits no mass.   Abdominal: Soft. Bowel sounds are normal.   Musculoskeletal: Normal range of motion.        Right lower leg: He exhibits edema.        Left lower leg: He exhibits edema.   Lymphadenopathy:     He has no cervical adenopathy.   Neurological: He is alert and oriented to person, place, and time.   Skin: Skin is warm and dry. Capillary refill takes 2 to 3 seconds.         ED Course   Procedures  Labs Reviewed   COMPREHENSIVE METABOLIC PANEL - Abnormal; Notable for the following components:       Result Value    CO2 21 (*)     Glucose 197 (*)     BUN, Bld 31 (*)     Creatinine 1.5 (*)     Albumin 3.2 (*)     Alkaline Phosphatase 51 (*)     Anion Gap 17 (*)     eGFR if  46.1 (*)     eGFR if non  39.8 (*)     All other components within normal limits   CBC W/ AUTO DIFFERENTIAL - Abnormal; Notable for the following components:    WBC 14.76 (*)     RBC 3.24 (*)     Hemoglobin 9.4 (*)     Hematocrit 28.8 (*)     Gran # (ANC) 13.2 (*)     Immature Grans (Abs) 0.07 (*)     Lymph # 0.4 (*)     Mono # 1.2 (*)     Gran% 89.1 (*)     Lymph% 2.4 (*)     All other components within normal limits   B-TYPE NATRIURETIC PEPTIDE - Abnormal; Notable for the following components:    BNP 1,116 (*)     All other components within normal limits          Imaging Results          X-Ray Chest AP Portable (Final result)  Result time 12/30/18 09:41:03    Final result by Jonas Enriquez Jr., MD (12/30/18 09:41:03)                 Impression:       Cardiomegaly and intrapulmonary infiltrates bilaterally suggesting congestive heart failure.  New moderate left pleural effusion.      Electronically signed by: Jonas Enriquez MD  Date:    12/30/2018  Time:    09:41             Narrative:    EXAMINATION:  XR CHEST AP PORTABLE    CLINICAL HISTORY:  Shortness of breath    TECHNIQUE:  Single frontal view of the chest was performed.    COMPARISON:  Chest of September 18, 2018.    FINDINGS:  There is cardiomegaly in a left ventricular predominant pattern.  There is prominence of the pulmonary vasculature and a right lower lobe infiltrate and a left mid lung infiltrates suggesting congestive heart failure.  There is a moderate left pleural effusion which is new.  No pneumothorax is seen.                                 Medical Decision Making:   Initial Assessment:   Crackles to bases bilat, 1+ edema, tachypneic @ 24 but answers questions appropriately.  99% sat NRB at present.    Differential Diagnosis:   CHF, pneumonia, bronchitis                      Clinical Impression:   The primary encounter diagnosis was Acute on chronic congestive heart failure, unspecified heart failure type. A diagnosis of Shortness of breath was also pertinent to this visit.                             Cuauhtemoc Rubin NP  12/31/18 [id number]

## (undated) DEVICE — NDL SAFETY 25G X 1.5 ECLIPSE

## (undated) DEVICE — LINER SUCTION 3000CC

## (undated) DEVICE — LUBRICANT SURGILUBE 2 OZ

## (undated) DEVICE — PACK CUSTOM UNIV BASIN SLI

## (undated) DEVICE — STRAP OR TABLE 5IN X 72IN

## (undated) DEVICE — SOL 9P NACL IRR PIC IL

## (undated) DEVICE — SEE MEDLINE ITEM 146417

## (undated) DEVICE — SEE MEDLINE ITEM 157116

## (undated) DEVICE — SEE MEDLINE ITEM 146292

## (undated) DEVICE — SCRUB 10% POVIDONE IODINE 4OZ

## (undated) DEVICE — ELECTRODE REM PLYHSV RETURN 9

## (undated) DEVICE — SLEEVE SCD EXPRESS CALF MEDIUM

## (undated) DEVICE — GLOVE PROTEXIS PI SYN SURG 7.5

## (undated) DEVICE — BLADE SURG #15 CARBON STEEL

## (undated) DEVICE — DRAPE MINOR PROCEDURE

## (undated) DEVICE — SYR 10CC LUER LOCK

## (undated) DEVICE — GAUZE SPONGE BULKEE 6X6.75IN

## (undated) DEVICE — BRIEF MESH LARGE

## (undated) DEVICE — TAPE SILK 3IN